# Patient Record
Sex: FEMALE | Race: BLACK OR AFRICAN AMERICAN | NOT HISPANIC OR LATINO | Employment: PART TIME | URBAN - METROPOLITAN AREA
[De-identification: names, ages, dates, MRNs, and addresses within clinical notes are randomized per-mention and may not be internally consistent; named-entity substitution may affect disease eponyms.]

---
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
## 2019-12-31 ENCOUNTER — HISTORICAL (OUTPATIENT)
Dept: ADMINISTRATIVE | Facility: HOSPITAL | Age: 19
End: 2019-12-31

## 2020-01-02 ENCOUNTER — HISTORICAL (OUTPATIENT)
Dept: ADMINISTRATIVE | Facility: HOSPITAL | Age: 20
End: 2020-01-02

## 2020-01-02 LAB
ABS NEUT (OLG): 9.17 X10(3)/MCL (ref 2.1–9.2)
AMPHET UR QL SCN: NEGATIVE
BARBITURATE SCN PRESENT UR: NEGATIVE
BASOPHILS # BLD AUTO: 0 X10(3)/MCL (ref 0–0.2)
BASOPHILS NFR BLD AUTO: 0 %
BENZODIAZ UR QL SCN: NEGATIVE
BILIRUB SERPL-MCNC: NEGATIVE MG/DL
BLOOD URINE, POC: NORMAL
BUN SERPL-MCNC: 9 MG/DL (ref 7–18)
CALCIUM SERPL-MCNC: 8.8 MG/DL (ref 8.5–10.1)
CANNABINOIDS UR QL SCN: NEGATIVE
CHLORIDE SERPL-SCNC: 107 MMOL/L (ref 98–107)
CLARITY, POC UA: NORMAL
CO2 SERPL-SCNC: 28 MMOL/L (ref 21–32)
COCAINE UR QL SCN: NEGATIVE
COLOR, POC UA: YELLOW
CREAT SERPL-MCNC: 0.6 MG/DL (ref 0.6–1.3)
CREAT/UREA NIT SERPL: 15
EOSINOPHIL # BLD AUTO: 0.1 X10(3)/MCL (ref 0–0.9)
EOSINOPHIL NFR BLD AUTO: 1 %
ERYTHROCYTE [DISTWIDTH] IN BLOOD BY AUTOMATED COUNT: 13 % (ref 11.5–14.5)
GLUCOSE SERPL-MCNC: 74 MG/DL (ref 74–106)
GLUCOSE UR QL STRIP: NEGATIVE
HBV SURFACE AG SERPL QL IA: NEGATIVE
HCT VFR BLD AUTO: 34.6 % (ref 35–46)
HCV AB SERPL QL IA: NONREACTIVE
HGB BLD-MCNC: 11.8 GM/DL (ref 12–16)
HIV 1+2 AB+HIV1 P24 AG SERPL QL IA: NONREACTIVE
IMM GRANULOCYTES # BLD AUTO: 0.12 10*3/UL
IMM GRANULOCYTES NFR BLD AUTO: 1 %
KETONES UR QL STRIP: NEGATIVE
LEUKOCYTE EST, POC UA: NORMAL
LYMPHOCYTES # BLD AUTO: 2.4 X10(3)/MCL (ref 0.6–4.6)
LYMPHOCYTES NFR BLD AUTO: 18 %
MCH RBC QN AUTO: 32 PG (ref 26–34)
MCHC RBC AUTO-ENTMCNC: 34.1 GM/DL (ref 31–37)
MCV RBC AUTO: 93.8 FL (ref 80–100)
MONOCYTES # BLD AUTO: 1 X10(3)/MCL (ref 0.1–1.3)
MONOCYTES NFR BLD AUTO: 8 %
NEUTROPHILS # BLD AUTO: 9.17 X10(3)/MCL (ref 2.1–9.2)
NEUTROPHILS NFR BLD AUTO: 72 %
NITRITE, POC UA: NEGATIVE
OPIATES UR QL SCN: NEGATIVE
PCP UR QL: NEGATIVE
PH UR STRIP.AUTO: 7 [PH] (ref 5–8)
PH, POC UA: 7
PLATELET # BLD AUTO: 288 X10(3)/MCL (ref 130–400)
PMV BLD AUTO: 9.8 FL (ref 7.4–10.4)
POTASSIUM SERPL-SCNC: 3.9 MMOL/L (ref 3.5–5.1)
PROTEIN, POC: NEGATIVE
RBC # BLD AUTO: 3.69 X10(6)/MCL (ref 4–5.2)
SODIUM SERPL-SCNC: 138 MMOL/L (ref 136–145)
SPECIFIC GRAVITY, POC UA: 1.02
TEMPERATURE, URINE (OHS): 25 DEGC (ref 20–25)
UROBILINOGEN, POC UA: NORMAL
WBC # SPEC AUTO: 12.8 X10(3)/MCL (ref 4.5–11)

## 2020-01-05 LAB — FINAL CULTURE: NO GROWTH

## 2020-01-08 ENCOUNTER — HISTORICAL (OUTPATIENT)
Dept: ADMINISTRATIVE | Facility: HOSPITAL | Age: 20
End: 2020-01-08

## 2020-01-08 LAB
APPEARANCE, UA: CLEAR
BACTERIA #/AREA URNS AUTO: ABNORMAL /HPF
BILIRUB SERPL-MCNC: NEGATIVE MG/DL
BILIRUB UR QL STRIP: NEGATIVE
BLOOD URINE, POC: NORMAL
CLARITY, POC UA: CLEAR
COLOR UR: YELLOW
COLOR, POC UA: YELLOW
GLUCOSE (UA): NEGATIVE
GLUCOSE 1H P 100 G GLC PO SERPL-MCNC: 89 MG/DL
GLUCOSE 2H P 100 G GLC PO SERPL-MCNC: 61 MG/DL (ref 65–139)
GLUCOSE P FAST SERPL-MCNC: 86 MG/DL (ref 65–99)
GLUCOSE UR QL STRIP: NEGATIVE
HGB UR QL STRIP: 0.2
HYALINE CASTS #/AREA URNS LPF: ABNORMAL /LPF
KETONES UR QL STRIP: NEGATIVE
KETONES UR QL STRIP: NEGATIVE
LEUKOCYTE EST, POC UA: NORMAL
LEUKOCYTE ESTERASE UR QL STRIP: 75 LEU/UL
NITRITE UR QL STRIP: NEGATIVE
NITRITE, POC UA: NEGATIVE
PH UR STRIP: 6.5 [PH] (ref 4.5–8)
PH, POC UA: 7
PROT UR QL STRIP: 10 MG/DL
PROTEIN, POC: NEGATIVE
RBC #/AREA URNS AUTO: ABNORMAL /HPF
SP GR UR STRIP: 1.02 (ref 1–1.03)
SPECIFIC GRAVITY, POC UA: 1.02
SQUAMOUS #/AREA URNS LPF: >100 /LPF
UROBILINOGEN UR STRIP-ACNC: NORMAL
UROBILINOGEN, POC UA: NORMAL
WBC #/AREA URNS AUTO: ABNORMAL /HPF

## 2020-01-31 ENCOUNTER — HISTORICAL (OUTPATIENT)
Dept: ADMINISTRATIVE | Facility: HOSPITAL | Age: 20
End: 2020-01-31

## 2020-01-31 LAB
APPEARANCE, UA: CLEAR
BACTERIA #/AREA URNS AUTO: ABNORMAL /HPF
BILIRUB SERPL-MCNC: NEGATIVE MG/DL
BILIRUB UR QL STRIP: NEGATIVE
BLOOD URINE, POC: NORMAL
CLARITY, POC UA: CLEAR
COLOR UR: ABNORMAL
COLOR, POC UA: YELLOW
GLUCOSE (UA): NEGATIVE
GLUCOSE UR QL STRIP: NEGATIVE
HGB UR QL STRIP: 0.1
HYALINE CASTS #/AREA URNS LPF: ABNORMAL /LPF
KETONES UR QL STRIP: NEGATIVE
KETONES UR QL STRIP: NEGATIVE
LEUKOCYTE EST, POC UA: NORMAL
LEUKOCYTE ESTERASE UR QL STRIP: 75 LEU/UL
NITRITE UR QL STRIP: NEGATIVE
NITRITE, POC UA: NEGATIVE
PH UR STRIP: 6.5 [PH] (ref 4.5–8)
PH, POC UA: 7
PROT UR QL STRIP: NEGATIVE
PROTEIN, POC: NEGATIVE
RBC #/AREA URNS AUTO: ABNORMAL /HPF
SP GR UR STRIP: 1.01 (ref 1–1.03)
SPECIFIC GRAVITY, POC UA: 1.02
SQUAMOUS #/AREA URNS LPF: ABNORMAL /LPF
T PALLIDUM AB SER QL: NONREACTIVE
UROBILINOGEN UR STRIP-ACNC: NORMAL
UROBILINOGEN, POC UA: NORMAL
WBC #/AREA URNS AUTO: ABNORMAL /HPF

## 2020-02-02 LAB — FINAL CULTURE: NORMAL

## 2020-03-16 LAB
BILIRUB SERPL-MCNC: NEGATIVE MG/DL
BLOOD URINE, POC: NORMAL
CLARITY, POC UA: CLEAR
COLOR, POC UA: YELLOW
GLUCOSE UR QL STRIP: NEGATIVE
KETONES UR QL STRIP: NEGATIVE
LEUKOCYTE EST, POC UA: NORMAL
NITRITE, POC UA: NEGATIVE
PH, POC UA: 6.5
PROTEIN, POC: NEGATIVE
SPECIFIC GRAVITY, POC UA: 1.03
UROBILINOGEN, POC UA: NORMAL

## 2020-04-16 ENCOUNTER — HISTORICAL (OUTPATIENT)
Dept: ADMINISTRATIVE | Facility: HOSPITAL | Age: 20
End: 2020-04-16

## 2020-04-16 LAB
ABS NEUT (OLG): 8.11 X10(3)/MCL (ref 2.1–9.2)
BASOPHILS # BLD AUTO: 0 X10(3)/MCL (ref 0–0.2)
BASOPHILS NFR BLD AUTO: 0 %
BILIRUB SERPL-MCNC: NEGATIVE MG/DL
BLOOD URINE, POC: NORMAL
CLARITY, POC UA: CLEAR
COLOR, POC UA: YELLOW
EOSINOPHIL # BLD AUTO: 0.1 X10(3)/MCL (ref 0–0.9)
EOSINOPHIL NFR BLD AUTO: 1 %
ERYTHROCYTE [DISTWIDTH] IN BLOOD BY AUTOMATED COUNT: 13.2 % (ref 11.5–14.5)
GLUCOSE UR QL STRIP: NEGATIVE
HCT VFR BLD AUTO: 32.8 % (ref 35–46)
HGB BLD-MCNC: 11.1 GM/DL (ref 12–16)
HIV 1+2 AB+HIV1 P24 AG SERPL QL IA: NONREACTIVE
IMM GRANULOCYTES # BLD AUTO: 0.15 10*3/UL
IMM GRANULOCYTES NFR BLD AUTO: 1 %
KETONES UR QL STRIP: NEGATIVE
LEUKOCYTE EST, POC UA: NEGATIVE
LYMPHOCYTES # BLD AUTO: 1.9 X10(3)/MCL (ref 0.6–4.6)
LYMPHOCYTES NFR BLD AUTO: 17 %
MCH RBC QN AUTO: 31.7 PG (ref 26–34)
MCHC RBC AUTO-ENTMCNC: 33.8 GM/DL (ref 31–37)
MCV RBC AUTO: 93.7 FL (ref 80–100)
MONOCYTES # BLD AUTO: 0.8 X10(3)/MCL (ref 0.1–1.3)
MONOCYTES NFR BLD AUTO: 7 %
NEUTROPHILS # BLD AUTO: 8.11 X10(3)/MCL (ref 2.1–9.2)
NEUTROPHILS NFR BLD AUTO: 73 %
NITRITE, POC UA: NEGATIVE
PH, POC UA: 7
PLATELET # BLD AUTO: 272 X10(3)/MCL (ref 130–400)
PMV BLD AUTO: 9.2 FL (ref 7.4–10.4)
PROTEIN, POC: NEGATIVE
RBC # BLD AUTO: 3.5 X10(6)/MCL (ref 4–5.2)
SPECIFIC GRAVITY, POC UA: 1.02
T PALLIDUM AB SER QL: NONREACTIVE
UROBILINOGEN, POC UA: NORMAL
WBC # SPEC AUTO: 11.1 X10(3)/MCL (ref 4.5–11)

## 2020-05-19 LAB
BILIRUB SERPL-MCNC: NEGATIVE MG/DL
BLOOD URINE, POC: NORMAL
CLARITY, POC UA: CLEAR
COLOR, POC UA: YELLOW
GLUCOSE UR QL STRIP: NEGATIVE
KETONES UR QL STRIP: NEGATIVE
LEUKOCYTE EST, POC UA: NEGATIVE
NITRITE, POC UA: NEGATIVE
PH, POC UA: 6.5
PROTEIN, POC: NEGATIVE
SPECIFIC GRAVITY, POC UA: 1.02
UROBILINOGEN, POC UA: NORMAL

## 2020-06-03 ENCOUNTER — HISTORICAL (OUTPATIENT)
Dept: ADMINISTRATIVE | Facility: HOSPITAL | Age: 20
End: 2020-06-03

## 2020-06-03 LAB
ABS NEUT (OLG): 8.75 X10(3)/MCL (ref 2.1–9.2)
BASOPHILS # BLD AUTO: 0.1 X10(3)/MCL (ref 0–0.2)
BASOPHILS NFR BLD AUTO: 0 %
BILIRUB SERPL-MCNC: NEGATIVE MG/DL
BLOOD URINE, POC: NEGATIVE
CLARITY, POC UA: NORMAL
COLOR, POC UA: YELLOW
EOSINOPHIL # BLD AUTO: 0.1 X10(3)/MCL (ref 0–0.9)
EOSINOPHIL NFR BLD AUTO: 1 %
ERYTHROCYTE [DISTWIDTH] IN BLOOD BY AUTOMATED COUNT: 13.8 % (ref 11.5–14.5)
GLUCOSE UR QL STRIP: NEGATIVE
HCT VFR BLD AUTO: 32.3 % (ref 35–46)
HGB BLD-MCNC: 10.7 GM/DL (ref 12–16)
HIV 1+2 AB+HIV1 P24 AG SERPL QL IA: NONREACTIVE
IMM GRANULOCYTES # BLD AUTO: 0.18 10*3/UL
IMM GRANULOCYTES NFR BLD AUTO: 2 %
KETONES UR QL STRIP: NEGATIVE
LEUKOCYTE EST, POC UA: NORMAL
LYMPHOCYTES # BLD AUTO: 2 X10(3)/MCL (ref 0.6–4.6)
LYMPHOCYTES NFR BLD AUTO: 16 %
MCH RBC QN AUTO: 31.1 PG (ref 26–34)
MCHC RBC AUTO-ENTMCNC: 33.1 GM/DL (ref 31–37)
MCV RBC AUTO: 93.9 FL (ref 80–100)
MONOCYTES # BLD AUTO: 1 X10(3)/MCL (ref 0.1–1.3)
MONOCYTES NFR BLD AUTO: 8 %
NEUTROPHILS # BLD AUTO: 8.75 X10(3)/MCL (ref 2.1–9.2)
NEUTROPHILS NFR BLD AUTO: 72 %
NITRITE, POC UA: NEGATIVE
PH, POC UA: 6.5
PLATELET # BLD AUTO: 277 X10(3)/MCL (ref 130–400)
PMV BLD AUTO: 9.6 FL (ref 7.4–10.4)
PROTEIN, POC: NORMAL
RBC # BLD AUTO: 3.44 X10(6)/MCL (ref 4–5.2)
SPECIFIC GRAVITY, POC UA: 1.02
T PALLIDUM AB SER QL: NONREACTIVE
UROBILINOGEN, POC UA: NORMAL
WBC # SPEC AUTO: 12.1 X10(3)/MCL (ref 4.5–11)

## 2020-06-10 ENCOUNTER — HISTORICAL (OUTPATIENT)
Dept: ADMINISTRATIVE | Facility: HOSPITAL | Age: 20
End: 2020-06-10

## 2020-06-10 LAB
BILIRUB SERPL-MCNC: NEGATIVE MG/DL
BLOOD URINE, POC: NORMAL
CLARITY, POC UA: CLEAR
COLOR, POC UA: YELLOW
GLUCOSE UR QL STRIP: NEGATIVE
KETONES UR QL STRIP: NEGATIVE
LEUKOCYTE EST, POC UA: NORMAL
NITRITE, POC UA: NEGATIVE
PH, POC UA: 6.5
PROTEIN, POC: NEGATIVE
SPECIFIC GRAVITY, POC UA: 1.02
UROBILINOGEN, POC UA: NORMAL

## 2020-06-12 LAB — FINAL CULTURE: NORMAL

## 2020-06-19 ENCOUNTER — HISTORICAL (OUTPATIENT)
Dept: ADMINISTRATIVE | Facility: HOSPITAL | Age: 20
End: 2020-06-19

## 2020-06-19 LAB
APPEARANCE, UA: CLEAR
BACTERIA #/AREA URNS AUTO: ABNORMAL /HPF
BILIRUB SERPL-MCNC: NEGATIVE MG/DL
BILIRUB UR QL STRIP: NEGATIVE
BLOOD URINE, POC: NORMAL
CLARITY, POC UA: NORMAL
COLOR UR: ABNORMAL
COLOR, POC UA: YELLOW
GLUCOSE (UA): NEGATIVE
GLUCOSE UR QL STRIP: NEGATIVE
HGB UR QL STRIP: NEGATIVE
HYALINE CASTS #/AREA URNS LPF: ABNORMAL /LPF
KETONES UR QL STRIP: NEGATIVE
KETONES UR QL STRIP: NEGATIVE
LEUKOCYTE EST, POC UA: NORMAL
LEUKOCYTE ESTERASE UR QL STRIP: 250 LEU/UL
NITRITE UR QL STRIP: NEGATIVE
NITRITE, POC UA: NEGATIVE
PH UR STRIP: 7 [PH] (ref 4.5–8)
PH, POC UA: 7
PROT UR QL STRIP: 10 MG/DL
PROTEIN, POC: NEGATIVE
RBC #/AREA URNS AUTO: ABNORMAL /HPF
SP GR UR STRIP: 1.01 (ref 1–1.03)
SPECIFIC GRAVITY, POC UA: 1.02
SQUAMOUS #/AREA URNS LPF: ABNORMAL /LPF
UROBILINOGEN UR STRIP-ACNC: NORMAL
UROBILINOGEN, POC UA: NORMAL
WBC #/AREA URNS AUTO: ABNORMAL /HPF

## 2020-06-21 LAB — FINAL CULTURE: NO GROWTH

## 2020-06-26 LAB
BILIRUB SERPL-MCNC: NEGATIVE MG/DL
BLOOD URINE, POC: NORMAL
CLARITY, POC UA: CLEAR
COLOR, POC UA: YELLOW
GLUCOSE UR QL STRIP: NEGATIVE
KETONES UR QL STRIP: NEGATIVE
NITRITE, POC UA: NEGATIVE
PH, POC UA: 6
PROTEIN, POC: NEGATIVE
SPECIFIC GRAVITY, POC UA: 1.01
UROBILINOGEN, POC UA: NORMAL

## 2020-09-03 LAB — POC BETA-HCG (QUAL): NEGATIVE

## 2020-12-01 LAB — POC BETA-HCG (QUAL): NEGATIVE

## 2021-06-01 LAB — POC BETA-HCG (QUAL): NEGATIVE

## 2021-09-07 LAB — POC BETA-HCG (QUAL): NEGATIVE

## 2021-10-09 ENCOUNTER — HISTORICAL (OUTPATIENT)
Dept: ADMINISTRATIVE | Facility: HOSPITAL | Age: 21
End: 2021-10-09

## 2021-10-09 LAB — SARS-COV-2 RNA RESP QL NAA+PROBE: NEGATIVE

## 2021-10-12 LAB — FINAL CULTURE: NORMAL

## 2022-01-02 ENCOUNTER — HISTORICAL (OUTPATIENT)
Dept: ADMINISTRATIVE | Facility: HOSPITAL | Age: 22
End: 2022-01-02

## 2022-01-02 LAB
FLUAV AG UPPER RESP QL IA.RAPID: NEGATIVE
FLUBV AG UPPER RESP QL IA.RAPID: NEGATIVE
SARS-COV-2 RNA RESP QL NAA+PROBE: DETECTED

## 2022-04-10 ENCOUNTER — HISTORICAL (OUTPATIENT)
Dept: ADMINISTRATIVE | Facility: HOSPITAL | Age: 22
End: 2022-04-10
Payer: MEDICAID

## 2022-04-24 VITALS
WEIGHT: 249.13 LBS | SYSTOLIC BLOOD PRESSURE: 109 MMHG | BODY MASS INDEX: 47.03 KG/M2 | DIASTOLIC BLOOD PRESSURE: 75 MMHG | HEIGHT: 61 IN | OXYGEN SATURATION: 98 %

## 2022-04-30 NOTE — PROGRESS NOTES
Patient:   Bebe Mosley            MRN: [id number]            FIN: [id number]               Age:   19 years     Sex:  Female     :  2000   Associated Diagnoses:   IUP (intrauterine pregnancy), incidental   Author:   Jonna Suggs DO      Chief Complaint   Pregnant      History of Present Illness     History of Present Illness:  19-year-old , at 14W6D EGD per LMP confirmed with US 19 presents for her initial OB visit.         Today: no complaints, recently had yeast infection      Chronic Issues: none      OB Review of Systems:  Vaginal bleeding: denies  Vaginal discharge: white, odor  Loss of fluid: denies  Contractions: denies  Headaches: denies  Vision changes: denies  Edema: denies      Gestational History:  -G1: current      Gyn History:   -LMP: 19  -Age at menarche: 8yo  -Menstrual hx: regular every 28days, heavy, cramping  -History of STDs and/or Abnormal PAPs: none    Past Medical History: eczema   Surgical History: tonsillectomy and adenoidectomy  Family History: Dad: diabetes  Social History: denies tobacco use, social drinker prior to pregnancy, marijuana use prior to finding out she was pregnant  Medications: none  Allergies: nkda             Review of Systems   Constitutional:  No fever, No chills, No fatigue.    Eye:  No recent visual problem.    Respiratory:  Shortness of breath, No cough.    Cardiovascular:  No chest pain, No palpitations, No peripheral edema.    Gastrointestinal:  Nausea, No vomiting, No diarrhea, No constipation.    Genitourinary:  No dysuria, No hematuria.    Hematology/Lymphatics:  No bleeding tendency.    Musculoskeletal:       Back pain: In the middle of the back, In the lower region, The pain is moderate.    Integumentary:  No rash, No pruritus, No breakdown.    Psychiatric:  No anxiety, No depression.       Physical Examination   Vital Signs   2020 13:52 CST       Temperature Oral          37.1 DegC                              Temperature Oral (calculated)             98.78 DegF                             Peripheral Pulse Rate     74 bpm                             Respiratory Rate          18 br/min                             SpO2                      100 %                             Saturation Probe Site     Hand, right                             Systolic Blood Pressure   116 mmHg                             Diastolic Blood Pressure  63 mmHg                             Mean Arterial Pressure, Cuff              81 mmHg                             Blood Pressure Location   Left arm                             Blood Pressure Cuff Size  Adult     General:  Alert and oriented, No acute distress.    Eye:  Extraocular movements are intact.    Respiratory:  Lungs are clear to auscultation, Respirations are non-labored, Breath sounds are equal.    Cardiovascular:  Normal rate, Regular rhythm, No murmur, No edema.    Gastrointestinal:  Soft, Non-tender, Normal bowel sounds, No organomegaly, Gravid.    Obstetric Exam     Vulva: no external lesions noted on labia      Perineum: intact.     Vagina: scant white discharge  Cervix: no cervical tenderness   Uterus: fundal height at the level of the pubis    Adnexa: non-tender     Baby: Santoro/baby A, heart tones 145 bpm.            Review / Management   Initial OB Labs: Initial OB labs collected on 1/2/20   - Blood Type and Rh:    - Antibody Screen:    - CBC H/H:    - HIV:    - RPR:    - GC:    - CT:    - HBsAg:    - HCVAb:    - Rubella:    - Varicella:    - UA & Culture:    - Sickle Cell Screen:    - PAP:    - Influenza vaccine date:    15-20 Weeks Lab    - Quad Screen: _    28 Week Lab    - 1H GTT: _    - Rhogam: _    - Date of Tdap: _    - CBC H/H: _    - RPR: _    36 Week Lab    - CBC H/H: _    - RPR: _    - GBS Culture: _    - HIV: _    - Urine: GC: _      Radiology results     Ultrasound(s):  US 12/31/19:  History:  Check fetal presentation.     Reference  No previous.     Findings:  There  is a single intrauterine gestation demonstrated in breech  presentation. Fetal heart rate was 145 BPM. Anterior/fundal placenta.  Limited fetal survey demonstrates no gross abnormalities.     Average ultrasound age is 14 weeks 5 days.  BPD = 2.8 cm - 14 weeks 6 days.  HC = 10 cm - 14 weeks 4 days.  AC = 8.5 cm - 14 weeks 6 days.  FL = 1.5 cm - 14 weeks 3 days.     Estimated fetal weight - 102 grams +/- 15 grams (0 pounds 4 ounces +/-  1 ounces)  Amniotic fluid appears adequate for gestational age.     Impression:  Single intrauterine gestation with average ultrasound age of 14 weeks  5 days as described. Detailed fetal anatomic survey is recommended as  an outpatient under OB supervision at 18-20 weeks gestation.         Impression and Plan     1. Intrauterine Pregnancy:  - OB Protocol   - PNVs  - Routine (initial) labs: pending  - Mother plans on undecided on feeding  - Strict Labor Precautions: Discussed meds, diet, and complications associated with pregnancy in detail. fever, vaginal bleeding or leaking fluid, belly cramping or pain, shortness of breathchest pain, swelling of the facehands, ankles and feet or legs.  If don't feel the baby move in over an hour. Change in vision. Severe headache that are noted resolved with medication.  Strict diet and over-the-counter precautions were provided.   - Due to obesity and family history of diabetes, will get 2hr GTT next Wednesday      Disposition:  Assigned to FM resident  RTC in 4wks       Diagnosis     IUP (intrauterine pregnancy), incidental ([id number] Z34.90).       Jonna Suggs DO  [employer] FM Resident HO-1

## 2022-04-30 NOTE — PROGRESS NOTES
Patient:   Bebe Mosley            MRN: [id number]            FIN: [id number]               Age:   19 years     Sex:  Female     :  2000   Associated Diagnoses:   IUP (intrauterine pregnancy), incidental   Author:   Jonna Suggs DO      Chief Complaint   Pregnant   2020 8:13 CST       ob 19 week , c/o sharp pain to abd. at intervals        History of Present Illness     History of Present Illness:  19-year-old , at 19W0D EGD per LMP confirmed with US 19 presents for antepartum visit.         Today: having some stomach pains that come on and off, did have a sharp pain that lasted for a minute that worried her, but then went away; thinks maybe due to growing pains; no longer having headaches; has not been taking PNVs      Chronic Issues: none      OB Review of Systems:  Fetal Movement: yes  Vaginal bleeding: denies  Vaginal discharge: white, off and on  Loss of fluid: denies  Contractions: denies  Headaches: denies  Vision changes: denies  Edema: denies      Gestational History:  -G1: current      Gyn History:   -LMP: 19  -Age at menarche: 8yo  -Menstrual hx: regular every 28days, heavy, cramping  -History of STDs and/or Abnormal PAPs: none    Past Medical History: eczema   Surgical History: tonsillectomy and adenoidectomy  Family History: Dad: diabetes  Social History: denies tobacco use, social drinker prior to pregnancy, marijuana use prior to finding out she was pregnant  Medications: none  Allergies: nkda             Review of Systems   Constitutional:  No fever, No chills, No fatigue.    Eye:  No recent visual problem.    Respiratory:  Shortness of breath, No cough.    Cardiovascular:  No chest pain, No palpitations, No peripheral edema.    Gastrointestinal:  No nausea, No vomiting, No diarrhea, No constipation.    Genitourinary:  No dysuria, No hematuria.    Hematology/Lymphatics:  No bleeding tendency.    Musculoskeletal:       Back pain: In the middle of the  back, In the lower region, The pain is mild.    Integumentary:  No rash, No pruritus, No breakdown.    Psychiatric:  No anxiety, No depression.       Physical Examination   Vital Signs   1/31/2020 8:17 CST       Temperature Oral          36.4 DegC                             Temperature Oral (calculated)             97.52 DegF                             Peripheral Pulse Rate     65 bpm                             Respiratory Rate          20 br/min                             SpO2                      100 %                             Oxygen Therapy            Room air                             Systolic Blood Pressure   90 mmHg                             Diastolic Blood Pressure  50 mmHg  LOW                             Blood Pressure Location   Left arm     General: well appearing, pleasant, cheerful  RESP: clear to auscultation bilaterally, non labored  CV: regular rate and rhythm, no murmurs, no edema  ABD: gravid, nontender     FHTs: 145 bpm; (lower midline)     Fundal height: 18cm (difficult to assess due to body habitus)         Review / Management   Initial OB Labs: Initial OB labs collected on 1/2/20   - Blood Type and Rh: O+   - Antibody Screen:    - CBC H/H: 11.9/34.6   - HIV: nonreactive   - RPR:    - GC: not detected   - CT: not detected   - HBsAg: negative   - HCVAb: nonreactive   - Rubella:    - Varicella:    - UA & Culture:    - Sickle Cell Screen: negative   - PAP: NEGATIVE FOR INTRAEPITHELIAL LESION OR MALIGNANCY.   - Influenza vaccine date:    15-20 Weeks Lab    - Quad Screen: _    28 Week Lab    - 1H GTT: _    - Rhogam: _    - Date of Tdap: _    - CBC H/H: _    - RPR: _    36 Week Lab    - CBC H/H: _    - RPR: _    - GBS Culture: _    - HIV: _    - Urine: GC: _      Radiology results     Ultrasound(s):  US 12/31/19:  History:  Check fetal presentation.     Reference  No previous.     Findings:  There is a single intrauterine gestation demonstrated in breech  presentation. Fetal heart rate was  145 BPM. Anterior/fundal placenta.  Limited fetal survey demonstrates no gross abnormalities.     Average ultrasound age is 14 weeks 5 days.  BPD = 2.8 cm - 14 weeks 6 days.  HC = 10 cm - 14 weeks 4 days.  AC = 8.5 cm - 14 weeks 6 days.  FL = 1.5 cm - 14 weeks 3 days.     Estimated fetal weight - 102 grams +/- 15 grams (0 pounds 4 ounces +/-  1 ounces)  Amniotic fluid appears adequate for gestational age.     Impression:  Single intrauterine gestation with average ultrasound age of 14 weeks  5 days as described. Detailed fetal anatomic survey is recommended as  an outpatient under OB supervision at 18-20 weeks gestation.      Results review:  Lab results   1/31/2020 8:25 CST       Urine Color Urine Dipstick                Yellow                             Urine Appearance Urine Dipstick           Clear                             pH Urine Dipstick         7                             Specific Gravity Urine Dipstick           1.020                             Blood Urine Dipstick      Moderate                             Glucose Urine Dipstick    Negative                             Ketones Urine Dipstick    Negative                             Protein Urine Dipstick    Negative                             Bilirubin Urine Dipstick  Negative                             Urobilinogen Urine Dipstick               0.2 mg/dl                             Leukocytes Urine Dipstick Trace                             Nitrite Urine Dipstick    Negative  .       Impression and Plan     1. Intrauterine Pregnancy:  - OB Protocol   - Discussed taking PNVs daily  - Routine labs: as above; quad screen today, rubella, varicella, rpr; urinalysis sent  - Anatomy ultrasound to be scheduled  - Mother plans on breast feeding  - Mother wants contraception but undecided on kind at this time  - Strict Labor Precautions: Fever, vaginal bleeding or leaking fluid, belly cramping or pain, shortness of breathchest pain, swelling of the  facehands, ankles and feet or legs.  If don't feel the baby move in over an hour. Change in vision. Severe headache that are not resolved with medication.  Strict diet and over-the-counter precautions were provided. Discussed going to [facility] ED for any symptoms or signs of labor.  - Due to obesity and family history of diabetes, will get 2hr GTT was done and was negative      Disposition: RTC 4 weeks      Diagnosis     IUP (intrauterine pregnancy), incidental ([id number] Z34.90).       Jonna Suggs DO  [employer]  Resident HO-1

## 2022-05-03 NOTE — HISTORICAL [facility] CERNER
This is a historical note converted from [employer]. Formatting and pictures may have been removed.  Please reference [facility] for original formatting and attached multimedia. Chief Complaint  OB 39w0d - no c/o  History of Present Illness  20-year-old , at 39W0D EGD per LMP confirmed with US 19?presents for antepartum visit.?  ?   Today: pressure and cramping, feels cramping/contraction feelings 4-5x per day, lasts for couple minutes then resolves and doesnt come back for several hours  ?   Chronic Issues:?none  ?  Gestational History:  -G1: current  ?   Gyn History:?  -LMP: 19  -Age at menarche: 10yo  -Menstrual hx: regular every 28days, heavy, cramping  -History of STDs and/or Abnormal PAPs: none  ?  Past Medical History: eczema?  Surgical History: tonsillectomy and adenoidectomy  Family History: Dad: diabetes  Social History: denies tobacco use, social drinker prior to pregnancy, marijuana use prior to finding out she was pregnant  Medications: none  Allergies:?nkda  Review of Systems  OB Review of Systems:  ??Fetal Movements: Yes  ??Vaginal bleeding: None  ??Leakage of Fluid: None?  ??Vaginal Discharge:?white no odor  ??Headaches: None  ??Lower Extremity Edema:?yes  ??Vision Changes: None  ??Contractions:?see HPI  ???  Constitutional: no fever, no chills, no weight loss  CV:?bilateral feet?edema, no chest pain  :?no urinary retention, no urinary incontinence, denies dysuria  GI:?no fecal incontinence, no constipation, no diarrhea, no nausea, no vomiting, +heartburn  RESP:?no SOB, no wheezing, no difficulty breathing  Psych:?no depression, no anxiety  Physical Exam  Vitals & Measurements  T:?36.6? ?C (Oral)? HR:?95(Peripheral)? RR:?20? BP:?100/68? SpO2:?98%?  ?  General:?well appearing, pleasant, cheerful  RESP: clear to auscultation bilaterally, non labored  CV: regular rate and rhythm, no murmurs, no edema  ABD:?gravid, nontender  ???FHTs:?156 bpm  ???Fundal height:?40cm?  Cervix: 0cm,?-2 station,?0%  effacement  ??  ?  Review / Management?  Initial OB Labs  ????- Blood Type and Rh: O+  ????- Antibody Screen: neg  ????- CBC H/H: 11.9/34.6  ????- HIV: nonreactive  ????- RPR: NR  ????- GC: not detected  ????- CT: not detected  ????- HBsAg: negative  ????- HCVAb: nonreactive  ????- Rubella: immune  ????- Varicella: nonimmune  ????- UA & Culture: WNL, no culture obtained  ????- Sickle Cell Screen: negative  ????- PAP: NIL  ????- Influenza vaccine date:  ?  15-20 Weeks Lab  ???? - Quad Screen: Negative  ?  28 Week Lab  ???? - 1H GTT: 89  ???? - Rhogam: N/A  ???? - Date of Tdap: 6/10/2020  ???? - CBC H/H: 11.1/32.8  ???? - RPR: NR  ?  36 Week Lab-  ???? - CBC H/H: 10.7/32.3  ???? - RPR: nonreactive  ???? - GBS Culture: neg  ???? - HIV: nonreactive  ???? - cervical: GC: neg  ?  Radiology results:  Ultrasound(s): US 12/31/19: There is a single intrauterine gestation demonstrated in breech presentation. Fetal heart rate was 145 BPM. Anterior/fundal placenta. Limited fetal survey demonstrates no gross abnormalities. Average ultrasound age is 14 weeks 5 days.  20wk anatomy ultrasound 2/5/20: 19 5^7 weeks single viable intrauterine pregnancy with normal fetal growth, normal amniotic fluid volume, and no anomalies seen today.  Assessment/Plan  1.?39 weeks gestation of pregnancy?Z3A.39  - OB Protocol?  - PNV daily  - Routine labs: as above  - Urine dip reviewed trace blood, + leukocytes sending for UA and culture  - Mother plans on bottle feeding  - Contraception: wants Nexplanon considering getting Depo at the hospital prior to discharge  - NST today  - Strict Labor Precautions: Fever, vaginal bleeding or leaking fluid, belly cramping or pain, shortness of breath-chest pain, swelling of the face-hands, ankles and feet or legs. ?If dont feel the baby move in over an hour. Change in vision. Severe headache that are not resolved with medication.??Discussed going to [facility] ED for any symptoms or signs of labor.  - Due to obesity  and family history of diabetes, 2hr GTT was done and was negative  Ordered:  Clinic Follow up, *Est. 06/26/20 3:00:00 CDT, Order for future visit, 39 weeks gestation of pregnancy  Pregnant, [facility]  Urinalysis with Microscopic if Indicated, Routine collect, Urine, Order for future visit, 06/19/20 11:26:00 CDT, Stop date 06/19/20 11:26:00 CDT, Nurse collect, 39 weeks gestation of pregnancy  ?  2.?Pregnant?Z33.1  Ordered:  Clinic Follow up, *Est. 06/26/20 3:00:00 CDT, Order for future visit, 39 weeks gestation of pregnancy  Pregnant, [facility]  ?  RTC in 1 week unless delivers  ?  Jonna Suggs DO  [facility] Resident HO-1  Referrals  Clinic Follow up, *Est. 06/26/20 3:00:00 CDT, Order for future visit, 39 weeks gestation of pregnancy  Pregnant, [facility]   Problem List/Past Medical History  Ongoing  Infectious disease in mother complicating pregnancy, childbirth AND/OR puerperium  Morbid obesity  Pregnant  Historical  Eczema  Obesity  Procedure/Surgical History  Drainage of Right Upper Arm Skin, External Approach (04/21/2016)  Incision and drainage of abscess (eg, carbuncle, suppurative hidradenitis, cutaneous or subcutaneous abscess, cyst, furuncle, or paronychia); complicated or multiple (04/21/2016)  Tonsils and adenoids   Medications  Prenatal Multivitamin/Folic Acid 0.4 mg oral tablet ([facility] Substitution), 1 tab(s), Oral, Daily, 2 refills  Allergies  No Known Medication Allergies  Social History  Abuse/Neglect  No, No, Yes, 06/19/2020  Alcohol - Denies Alcohol Use, 04/21/2016  Never, 06/03/2020  Employment/School  Employed, Work/School description: [employer]. Highest education level: High school. No, 12/31/2019  Exercise  Exercise duration: 0., 12/31/2019  Home/Environment  Lives with Mother. Living situation: Home/Independent. TV/Computer concerns: No., 12/31/2019  Nutrition/Health  Regular, Good, 12/31/2019  Sexual  Sexually active: Yes. Sexual orientation:  Straight or heterosexual. Gender Identity Identifies as female. No, 12/31/2019  Substance Use - Denies Substance Abuse, 04/21/2016  Never, 06/19/2020  Tobacco - Denies Tobacco Use, 04/21/2016  Never (less than 100 in lifetime), N/A, 06/19/2020  Family History  Diabetes mellitus type 2: Father.  Immunizations  Vaccine Date Status   tetanus/diphtheria/pertussis, acel(Tdap) 06/10/2020 Given   meningococcal conjugate vaccine 12/09/2014 Recorded   tetanus/diphtheria/pertussis, acel(Tdap) 06/07/2013 Recorded   human papillomavirus vaccine 08/27/2012 Recorded   human papillomavirus vaccine 03/14/2011 Recorded   human papillomavirus vaccine 11/17/2009 Recorded   influenza virus vaccine, inactivated 11/17/2009 Recorded   meningococcal conjugate vaccine 11/17/2009 Recorded   diphtheria/pertussis, acel/tetanus ped 10/07/2008 Recorded   poliovirus vaccine, live, trivalent 08/12/2003 Recorded   measles/mumps/rubella virus vaccine 08/12/2003 Recorded   diphtheria/pertussis, acel/tetanus ped 03/27/2002 Recorded   poliovirus vaccine, inactivated 08/07/2000 Recorded   measles/mumps/rubella virus vaccine 08/07/2000 Recorded   diphtheria/pertussis, acel/tetanus ped 08/07/2000 Recorded   poliovirus vaccine, live, trivalent 2000 Recorded   hepatitis B pediatric vaccine 2000 Recorded   poliovirus vaccine, live, trivalent 2000 Recorded   hepatitis B pediatric vaccine 2000 Recorded   hepatitis B pediatric vaccine 1998 Recorded   Health Maintenance  Health Maintenance  ???Pending?(in the next year)  ??? ??OverDue  ??? ? ? ?Diabetes Screening due??and every?  ??? ??Due In Future?  ??? ? ? ?Alcohol Misuse Screening not due until??01/01/21??and every 1??year(s)  ??? ? ? ?Obesity Screening not due until??01/01/21??and every 1??year(s)  ??? ? ? ?Depression Screening not due until??01/30/21??and every 1??year(s)  ??? ? ? ?ADL Screening not due until??03/16/21??and every 1??year(s)  ???Satisfied?(in the past 1  year)  ??? ??Satisfied?  ??? ? ? ?ADL Screening on??03/16/20.??Satisfied by Allegra Stratton LPN  ??? ? ? ?Alcohol Misuse Screening on??03/16/20.??Satisfied by Allegra Stratton LPN  ??? ? ? ?Blood Pressure Screening on??06/19/20.??Satisfied by Jovana Greenberg RN  ??? ? ? ?Body Mass Index Check on??03/16/20.??Satisfied by Allegra Stratton LPN  ??? ? ? ?Cervical Cancer Screening on??01/02/20.??Satisfied by Jessica Burks  ??? ? ? ?Depression Screening on??01/31/20.??Satisfied by Licha Rossi LPN  ??? ? ? ?Diabetes Screening on??01/02/20.??Satisfied by Chelle Cummings  ??? ? ? ?Influenza Vaccine on??03/16/20.??Satisfied by Allegra Stratton LPN  ??? ? ? ?Obesity Screening on??06/10/20.??Satisfied by Bhavna Velez  ??? ? ? ?Tetanus Vaccine on??06/10/20.??Satisfied by Bhavna Velez  ?      Pt discussed with resident at the time of visit. Agree with assessment and plan.

## 2022-07-02 ENCOUNTER — HOSPITAL ENCOUNTER (EMERGENCY)
Facility: HOSPITAL | Age: 22
Discharge: HOME | End: 2022-07-02
Attending: EMERGENCY MEDICINE
Payer: MEDICAID

## 2022-07-02 VITALS
HEART RATE: 79 BPM | WEIGHT: 256 LBS | SYSTOLIC BLOOD PRESSURE: 107 MMHG | RESPIRATION RATE: 18 BRPM | HEIGHT: 61 IN | TEMPERATURE: 99 F | OXYGEN SATURATION: 100 % | DIASTOLIC BLOOD PRESSURE: 76 MMHG | BODY MASS INDEX: 48.33 KG/M2

## 2022-07-02 DIAGNOSIS — R55 NEAR SYNCOPE: ICD-10-CM

## 2022-07-02 DIAGNOSIS — R25.1 SHAKING: ICD-10-CM

## 2022-07-02 DIAGNOSIS — R53.1 WEAKNESS: Primary | ICD-10-CM

## 2022-07-02 LAB
ALBUMIN SERPL-MCNC: 3.5 GM/DL (ref 3.5–5)
ALBUMIN/GLOB SERPL: 0.8 RATIO (ref 1.1–2)
ALP SERPL-CCNC: 64 UNIT/L (ref 40–150)
ALT SERPL-CCNC: 13 UNIT/L (ref 0–55)
AMPHET UR QL SCN: NEGATIVE
APPEARANCE UR: CLEAR
AST SERPL-CCNC: 19 UNIT/L (ref 5–34)
B-HCG SERPL QL: NEGATIVE
BACTERIA #/AREA URNS AUTO: NORMAL /HPF
BARBITURATE SCN PRESENT UR: NEGATIVE
BASOPHILS # BLD AUTO: 0.06 X10(3)/MCL (ref 0–0.2)
BASOPHILS NFR BLD AUTO: 0.7 %
BENZODIAZ UR QL SCN: NEGATIVE
BILIRUB UR QL STRIP.AUTO: NEGATIVE MG/DL
BILIRUBIN DIRECT+TOT PNL SERPL-MCNC: 0.5 MG/DL
BUN SERPL-MCNC: 9.9 MG/DL (ref 7–18.7)
CALCIUM SERPL-MCNC: 8.7 MG/DL (ref 8.4–10.2)
CANNABINOIDS UR QL SCN: NEGATIVE
CHLORIDE SERPL-SCNC: 104 MMOL/L (ref 98–107)
CO2 SERPL-SCNC: 27 MMOL/L (ref 22–29)
COCAINE UR QL SCN: NEGATIVE
COLOR UR AUTO: ABNORMAL
CREAT SERPL-MCNC: 0.87 MG/DL (ref 0.55–1.02)
EOSINOPHIL # BLD AUTO: 0.09 X10(3)/MCL (ref 0–0.9)
EOSINOPHIL NFR BLD AUTO: 1.1 %
ERYTHROCYTE [DISTWIDTH] IN BLOOD BY AUTOMATED COUNT: 13.4 % (ref 11.5–17)
FLUAV AG UPPER RESP QL IA.RAPID: NOT DETECTED
FLUBV AG UPPER RESP QL IA.RAPID: NOT DETECTED
GLOBULIN SER-MCNC: 4.4 GM/DL (ref 2.4–3.5)
GLUCOSE SERPL-MCNC: 102 MG/DL (ref 74–100)
GLUCOSE UR QL STRIP.AUTO: NEGATIVE MG/DL
HCT VFR BLD AUTO: 39 % (ref 37–47)
HGB BLD-MCNC: 13.1 GM/DL (ref 12–16)
IMM GRANULOCYTES # BLD AUTO: 0.02 X10(3)/MCL (ref 0–0.04)
IMM GRANULOCYTES NFR BLD AUTO: 0.2 %
KETONES UR QL STRIP.AUTO: NEGATIVE MG/DL
LEUKOCYTE ESTERASE UR QL STRIP.AUTO: NEGATIVE UNIT/L
LIPASE SERPL-CCNC: 13 U/L
LYMPHOCYTES # BLD AUTO: 2.51 X10(3)/MCL (ref 0.6–4.6)
LYMPHOCYTES NFR BLD AUTO: 30.9 %
MCH RBC QN AUTO: 31.9 PG (ref 27–31)
MCHC RBC AUTO-ENTMCNC: 33.6 MG/DL (ref 33–36)
MCV RBC AUTO: 94.9 FL (ref 80–94)
MDMA UR QL SCN: NEGATIVE
MONOCYTES # BLD AUTO: 0.66 X10(3)/MCL (ref 0.1–1.3)
MONOCYTES NFR BLD AUTO: 8.1 %
NEUTROPHILS # BLD AUTO: 4.8 X10(3)/MCL (ref 2.1–9.2)
NEUTROPHILS NFR BLD AUTO: 59 %
NITRITE UR QL STRIP.AUTO: NEGATIVE
NRBC BLD AUTO-RTO: 0 %
OPIATES UR QL SCN: NEGATIVE
PCP UR QL: NEGATIVE
PH UR STRIP.AUTO: 6.5 [PH]
PH UR: 6.5 [PH] (ref 3–11)
PLATELET # BLD AUTO: 327 X10(3)/MCL (ref 130–400)
PMV BLD AUTO: 9.5 FL (ref 7.4–10.4)
POTASSIUM SERPL-SCNC: 3.6 MMOL/L (ref 3.5–5.1)
PROT SERPL-MCNC: 7.9 GM/DL (ref 6.4–8.3)
PROT UR QL STRIP.AUTO: NEGATIVE MG/DL
RBC # BLD AUTO: 4.11 X10(6)/MCL (ref 4.2–5.4)
RBC #/AREA URNS AUTO: NORMAL /HPF
RBC UR QL AUTO: ABNORMAL UNIT/L
SARS-COV-2 RNA RESP QL NAA+PROBE: NOT DETECTED
SODIUM SERPL-SCNC: 142 MMOL/L (ref 136–145)
SP GR UR STRIP.AUTO: 1.01
SPECIFIC GRAVITY, URINE AUTO (.000) (OHS): 1.01 (ref 1–1.03)
SQUAMOUS #/AREA URNS AUTO: NORMAL /HPF
TROPONIN I SERPL-MCNC: 0.01 NG/ML (ref 0–0.04)
UROBILINOGEN UR STRIP-ACNC: 0.2 MG/DL
WBC # SPEC AUTO: 8.1 X10(3)/MCL (ref 4.5–11.5)
WBC #/AREA URNS AUTO: NORMAL /HPF

## 2022-07-02 PROCEDURE — 63600175 PHARM REV CODE 636 W HCPCS: Performed by: EMERGENCY MEDICINE

## 2022-07-02 PROCEDURE — 93010 EKG 12-LEAD: ICD-10-PCS | Mod: ,,, | Performed by: INTERNAL MEDICINE

## 2022-07-02 PROCEDURE — 25000003 PHARM REV CODE 250: Performed by: EMERGENCY MEDICINE

## 2022-07-02 PROCEDURE — 99284 EMERGENCY DEPT VISIT MOD MDM: CPT | Mod: 25

## 2022-07-02 PROCEDURE — 81025 URINE PREGNANCY TEST: CPT | Performed by: EMERGENCY MEDICINE

## 2022-07-02 PROCEDURE — 83690 ASSAY OF LIPASE: CPT | Performed by: EMERGENCY MEDICINE

## 2022-07-02 PROCEDURE — 81001 URINALYSIS AUTO W/SCOPE: CPT | Mod: 59 | Performed by: EMERGENCY MEDICINE

## 2022-07-02 PROCEDURE — 93005 ELECTROCARDIOGRAM TRACING: CPT

## 2022-07-02 PROCEDURE — 84484 ASSAY OF TROPONIN QUANT: CPT | Performed by: EMERGENCY MEDICINE

## 2022-07-02 PROCEDURE — 93010 ELECTROCARDIOGRAM REPORT: CPT | Mod: ,,, | Performed by: INTERNAL MEDICINE

## 2022-07-02 PROCEDURE — 85025 COMPLETE CBC W/AUTO DIFF WBC: CPT | Performed by: EMERGENCY MEDICINE

## 2022-07-02 PROCEDURE — 80053 COMPREHEN METABOLIC PANEL: CPT | Performed by: EMERGENCY MEDICINE

## 2022-07-02 PROCEDURE — 36415 COLL VENOUS BLD VENIPUNCTURE: CPT | Performed by: EMERGENCY MEDICINE

## 2022-07-02 PROCEDURE — 96374 THER/PROPH/DIAG INJ IV PUSH: CPT

## 2022-07-02 PROCEDURE — 80307 DRUG TEST PRSMV CHEM ANLYZR: CPT | Performed by: EMERGENCY MEDICINE

## 2022-07-02 PROCEDURE — 87636 SARSCOV2 & INF A&B AMP PRB: CPT | Performed by: EMERGENCY MEDICINE

## 2022-07-02 RX ORDER — KETOROLAC TROMETHAMINE 30 MG/ML
30 INJECTION, SOLUTION INTRAMUSCULAR; INTRAVENOUS
Status: COMPLETED | OUTPATIENT
Start: 2022-07-02 | End: 2022-07-02

## 2022-07-02 RX ADMIN — SODIUM CHLORIDE 1000 ML: 9 INJECTION, SOLUTION INTRAVENOUS at 05:07

## 2022-07-02 RX ADMIN — KETOROLAC TROMETHAMINE 30 MG: 30 INJECTION, SOLUTION INTRAMUSCULAR at 05:07

## 2022-07-02 NOTE — ED TRIAGE NOTES
C/o shaking and feeling nauseated like she was about to pass out at 1600 today. States she went out last night and consumed 2 bottles of [employer] clear liquor.

## 2022-07-02 NOTE — ED PROVIDER NOTES
Encounter Date: 7/2/2022       History     Chief Complaint   Patient presents with    Shaking     C/o shaking and feeling nauseated like she was about to pass out at 1600 today. States she went out last night and consumed 2 bottles of [employer] clear liquor.     The history is provided by the patient. No  was used.   General Illness   The current episode started 1 to 2 hours ago. The problem has been gradually improving. Nothing relieves the symptoms. Nothing aggravates the symptoms. Pertinent negatives include no fever, no nausea, no sore throat, no shortness of breath and no rash.   Admits to heavy EtOH intake last night and feeling hungover this AM.  This afternoon, she began shaking and felt near-syncopal.  Somewhat improved now.    Review of patient's allergies indicates:  No Known Allergies  No past medical history on file. none  No past surgical history on file. T&A  No family history on file.     Review of Systems   Constitutional: Negative for fever.   HENT: Negative for sore throat.    Respiratory: Negative for shortness of breath.    Cardiovascular: Negative for chest pain.   Gastrointestinal: Negative for nausea.   Genitourinary: Negative for dysuria.   Musculoskeletal: Negative for back pain.   Skin: Negative for rash.   Neurological: Negative for weakness.   Hematological: Does not bruise/bleed easily.       Physical Exam     Initial Vitals [07/02/22 1713]   BP Pulse Resp Temp SpO2   107/76 79 18 98.6 °F (37 °C) 100 %      MAP       --         Physical Exam    Nursing note and vitals reviewed.  Constitutional: She appears well-developed and well-nourished.   HENT:   Head: Normocephalic and atraumatic.   Right Ear: External ear normal.   Left Ear: External ear normal.   Eyes: Conjunctivae and EOM are normal. Pupils are equal, round, and reactive to light.   Neck: Neck supple.   Normal range of motion.  Cardiovascular: Normal rate, regular rhythm, normal heart sounds and intact  distal pulses.   Pulmonary/Chest: Breath sounds normal.   Abdominal: Abdomen is soft. Bowel sounds are normal.   obese   Musculoskeletal:         General: Normal range of motion.      Cervical back: Normal range of motion and neck supple.     Neurological: She is alert and oriented to person, place, and time. GCS score is 15. GCS eye subscore is 4. GCS verbal subscore is 5. GCS motor subscore is 6.   Skin: Skin is warm and dry. Capillary refill takes less than 2 seconds.   Psychiatric: She has a normal mood and affect. Her behavior is normal. Judgment and thought content normal.         ED Course   Procedures  Labs Reviewed   COMPREHENSIVE METABOLIC PANEL - Abnormal; Notable for the following components:       Result Value    Glucose Level 102 (*)     Globulin 4.4 (*)     Albumin/Globulin Ratio 0.8 (*)     All other components within normal limits   URINALYSIS, REFLEX TO URINE CULTURE - Abnormal; Notable for the following components:    Color, UA Straw (*)     Blood, UA Small (*)     All other components within normal limits   CBC WITH DIFFERENTIAL - Abnormal; Notable for the following components:    RBC 4.11 (*)     MCV 94.9 (*)     MCH 31.9 (*)     All other components within normal limits   PREGNANCY TEST, URINE RAPID - Normal   LIPASE - Normal   TROPONIN I - Normal   DRUG SCREEN, URINE (BEAKER) - Normal    Narrative:     Cut off concentrations:    Amphetamines - 1000 ng/ml  Barbiturates - 200 ng/ml  Benzodiazepine - 200 ng/ml  Cannabinoids (THC) - 50 ng/ml  Cocaine - 300 ng/ml  Fentanyl - 1.0 ng/ml  MDMA - 500 ng/ml  Opiates - 300 ng/ml   Phencyclidine (PCP) - 25 ng/ml    Specimen submitted for drug analysis and tested for pH and specific gravity in order to evaluate sample integrity. Suspect tampering if specific gravity is <1.003 and/or pH is not within the range of 4.5 - 8.0  False negatives may result form substances such as bleach added to urine.  False positives may result for the presence of a substance  with similar chemical structure to the drug or its metabolite.    This test provides only a PRELIMINARY analytical test result. A more specific alternate chemical method must be used in order to obtain a confirmed analytical result. Gas chromatography/mass spectrometry (GC/MS) is the preferred confirmatory method. Other chemical confirmation methods are available. Clinical consideration and professional judgement should be applied to any drug of abuse test result, particularly when preliminary positive results are used.    Positive results will be confirmed only at the physicians request. Unconfirmed screening results are to be used only for medical purposes (treatment).        URINALYSIS, MICROSCOPIC - Normal    Narrative:     Urine microscopic results may be inaccurate, <3cc sample submitted. Microscopic performed on unspun urine   CBC W/ AUTO DIFFERENTIAL    Narrative:     The following orders were created for panel order CBC auto differential.  Procedure                               Abnormality         Status                     ---------                               -----------         ------                     CBC with Differential[[id number]]        Abnormal            Final result                 Please view results for these tests on the individual orders.   COVID/FLU A&B PCR     EKG Readings: (Independently Interpreted)   Initial Reading: No STEMI. Rhythm: Normal Sinus Rhythm. Heart Rate: 71. Ectopy: No Ectopy. Conduction: Normal. ST Segments: Normal ST Segments. T Waves: Normal. Axis: Normal. Clinical Impression: Normal Sinus Rhythm       Imaging Results    None          Medications   sodium chloride 0.9% bolus 1,000 mL (1,000 mLs Intravenous New Bag 7/2/22 1751)   ketorolac injection 30 mg (30 mg Intravenous Given 7/2/22 1751)                          Clinical Impression:   Final diagnoses:  [R55] Near syncope  [R53.1] Weakness (Primary)  [R25.1] Shaking          ED Disposition Condition    Discharge  Stable        ED Prescriptions     None        Follow-up Information     Follow up With Specialties Details Why Contact Info    Follow up with your primary MD in 3-5 days if not improved.  Return to ED for worsening symptoms.               Camilo Clayton MD  07/02/22 3929

## 2022-07-10 ENCOUNTER — HOSPITAL ENCOUNTER (EMERGENCY)
Facility: HOSPITAL | Age: 22
Discharge: HOME | End: 2022-07-10
Attending: EMERGENCY MEDICINE
Payer: MEDICAID

## 2022-07-10 VITALS
HEART RATE: 81 BPM | OXYGEN SATURATION: 99 % | HEIGHT: 62 IN | SYSTOLIC BLOOD PRESSURE: 114 MMHG | BODY MASS INDEX: 46.93 KG/M2 | RESPIRATION RATE: 18 BRPM | TEMPERATURE: 98 F | WEIGHT: 255 LBS | DIASTOLIC BLOOD PRESSURE: 80 MMHG

## 2022-07-10 DIAGNOSIS — J10.1 INFLUENZA A: Primary | ICD-10-CM

## 2022-07-10 LAB
FLUAV AG UPPER RESP QL IA.RAPID: DETECTED
FLUBV AG UPPER RESP QL IA.RAPID: NOT DETECTED
SARS-COV-2 RNA RESP QL NAA+PROBE: NOT DETECTED

## 2022-07-10 PROCEDURE — 87636 SARSCOV2 & INF A&B AMP PRB: CPT | Performed by: EMERGENCY MEDICINE

## 2022-07-10 PROCEDURE — 99283 EMERGENCY DEPT VISIT LOW MDM: CPT | Mod: 25

## 2022-07-10 RX ORDER — OSELTAMIVIR PHOSPHATE 75 MG/1
75 CAPSULE ORAL 2 TIMES DAILY
Qty: 10 CAPSULE | Refills: 0 | Status: SHIPPED | OUTPATIENT
Start: 2022-07-10 | End: 2022-07-15

## 2022-07-10 NOTE — Clinical Note
"Bebe"Yanet Mosley was seen and treated in our emergency department on 7/10/2022.  She may return to work on 07/17/2022.       If you have any questions or concerns, please don't hesitate to call.      Camilo Clayton MD"

## 2022-07-10 NOTE — ED PROVIDER NOTES
Encounter Date: 7/10/2022       History     Chief Complaint   Patient presents with    Cough     Pt c/o of bodyaches, ha, and cough x few days, also reports dizziness      The history is provided by the patient. No  was used.   Cough  This is a new problem. The problem occurs hourly. The cough is non-productive. Maximum temperature: subjective. The fever has been present for 1 to 2 days. Associated symptoms include myalgias. Pertinent negatives include no chest pain, no sore throat and no shortness of breath.   Also notes extreme fatigue.    Review of patient's allergies indicates:  No Known Allergies  No past medical history on file. none  No past surgical history on file.  noncontributory  No family history on file.     Social History     Tobacco Use    Smoking status: Never Smoker    Smokeless tobacco: Never Used     Review of Systems   Constitutional: Negative for fever.   HENT: Negative for sore throat.    Respiratory: Positive for cough. Negative for shortness of breath.    Cardiovascular: Negative for chest pain.   Gastrointestinal: Negative for nausea.   Genitourinary: Negative for dysuria.   Musculoskeletal: Positive for myalgias. Negative for back pain.   Skin: Negative for rash.   Neurological: Negative for weakness.   Hematological: Does not bruise/bleed easily.       Physical Exam     Initial Vitals [07/10/22 0746]   BP Pulse Resp Temp SpO2   114/80 81 18 97.7 °F (36.5 °C) 99 %      MAP       --         Physical Exam    Nursing note and vitals reviewed.  Constitutional: She appears well-developed and well-nourished.   HENT:   Head: Normocephalic and atraumatic.   Right Ear: External ear normal.   Left Ear: External ear normal.   Eyes: Conjunctivae and EOM are normal. Pupils are equal, round, and reactive to light.   Neck: Neck supple.   Normal range of motion.  Cardiovascular: Normal rate, regular rhythm, normal heart sounds and intact distal pulses.   Pulmonary/Chest: Breath  sounds normal.   Abdominal: Abdomen is soft. Bowel sounds are normal.   Musculoskeletal:         General: Normal range of motion.      Cervical back: Normal range of motion and neck supple.     Neurological: She is alert and oriented to person, place, and time. GCS score is 15. GCS eye subscore is 4. GCS verbal subscore is 5. GCS motor subscore is 6.   Skin: Skin is warm and dry. Capillary refill takes less than 2 seconds.   Psychiatric: She has a normal mood and affect. Her behavior is normal. Judgment and thought content normal.         ED Course   Procedures  Labs Reviewed   COVID/FLU A&B PCR - Abnormal; Notable for the following components:       Result Value    Influenza A PCR Detected (*)     All other components within normal limits          Imaging Results    None          Medications - No data to display                       Clinical Impression:   Final diagnoses:  [J10.1] Influenza A (Primary)          ED Disposition Condition    Discharge Stable        ED Prescriptions     Medication Sig Dispense Start Date End Date Auth. Provider    oseltamivir (TAMIFLU) 75 MG capsule Take 1 capsule (75 mg total) by mouth 2 (two) times daily. for 5 days 10 capsule 7/10/2022 7/15/2022 Camilo Clayton MD        Follow-up Information     Follow up With Specialties Details Why Contact Info    Follow up with your primary MD in 3-5 days if not improved.  Return to ED for worsening symptoms.               Camilo Clayton MD  07/10/22 0902

## 2022-09-09 ENCOUNTER — HOSPITAL ENCOUNTER (EMERGENCY)
Facility: HOSPITAL | Age: 22
Discharge: HOME | End: 2022-09-09
Attending: STUDENT IN AN ORGANIZED HEALTH CARE EDUCATION/TRAINING PROGRAM
Payer: MEDICAID

## 2022-09-09 VITALS
RESPIRATION RATE: 18 BRPM | OXYGEN SATURATION: 99 % | HEIGHT: 61 IN | WEIGHT: 250 LBS | SYSTOLIC BLOOD PRESSURE: 120 MMHG | BODY MASS INDEX: 47.2 KG/M2 | DIASTOLIC BLOOD PRESSURE: 72 MMHG | TEMPERATURE: 98 F | HEART RATE: 75 BPM

## 2022-09-09 DIAGNOSIS — S29.011A PECTORALIS MUSCLE STRAIN, INITIAL ENCOUNTER: Primary | ICD-10-CM

## 2022-09-09 LAB — B-HCG SERPL QL: NEGATIVE

## 2022-09-09 PROCEDURE — 99283 EMERGENCY DEPT VISIT LOW MDM: CPT

## 2022-09-09 PROCEDURE — 81025 URINE PREGNANCY TEST: CPT | Performed by: STUDENT IN AN ORGANIZED HEALTH CARE EDUCATION/TRAINING PROGRAM

## 2022-09-09 PROCEDURE — 25000003 PHARM REV CODE 250: Performed by: STUDENT IN AN ORGANIZED HEALTH CARE EDUCATION/TRAINING PROGRAM

## 2022-09-09 RX ORDER — IBUPROFEN 600 MG/1
600 TABLET ORAL
Status: COMPLETED | OUTPATIENT
Start: 2022-09-09 | End: 2022-09-09

## 2022-09-09 RX ADMIN — IBUPROFEN 600 MG: 600 TABLET ORAL at 07:09

## 2022-09-09 NOTE — ED TRIAGE NOTES
Pt to er c/o chestpain onset two days ago, describes as pressure. Pt admits to weight lifting four days ago.

## 2022-09-10 NOTE — ED PROVIDER NOTES
Encounter Date: 9/9/2022       History     Chief Complaint   Patient presents with    Chest Pain     Pt to er c/o chestpain onset two days ago, describes as pressure. Pt admits to weight lifting four days ago.     HPI    This is a 22-year-old female who presents emergency department for chest pain as started approximately 3-4 days ago.  Patient states that she started working out lifting weights at started having some pain today after.  Patient states the pain is improving significantly and currently has no pain.  States it is an aching type of pain.  No cough or shortness of breath.  No worsening of pain on exertion.  States that if she lifts weights it hurts again.  Patient states that she want to get checked out because she never had this type of pain before.    Review of patient's allergies indicates:  No Known Allergies  No past medical history on file.  No past surgical history on file.  No family history on file.  Social History     Tobacco Use    Smoking status: Never    Smokeless tobacco: Never     Review of Systems   Constitutional:  Negative for fever.   Respiratory:  Negative for cough, shortness of breath and stridor.    Cardiovascular:  Positive for chest pain. Negative for palpitations and leg swelling.   Gastrointestinal:  Negative for abdominal pain.   Musculoskeletal:  Negative for back pain.   All other systems reviewed and are negative.    Physical Exam     Initial Vitals [09/09/22 1850]   BP Pulse Resp Temp SpO2   120/72 75 18 97.9 °F (36.6 °C) 99 %      MAP       --         Physical Exam    Nursing note and vitals reviewed.  Constitutional: She appears well-developed and well-nourished. No distress.   Cardiovascular:  Normal rate and regular rhythm.           Pulmonary/Chest: Breath sounds normal. No respiratory distress. She exhibits tenderness (Mild tenderness to palpation to the pectoralis bilaterally.).   Abdominal: Abdomen is soft. Bowel sounds are normal. There is no abdominal tenderness.    Musculoskeletal:         General: No tenderness. Normal range of motion.     Neurological: She is alert and oriented to person, place, and time. She has normal reflexes. GCS score is 15. GCS eye subscore is 4. GCS verbal subscore is 5. GCS motor subscore is 6.   Skin: Skin is warm. Capillary refill takes less than 2 seconds.   Psychiatric: She has a normal mood and affect. Thought content normal.       ED Course   Procedures  Labs Reviewed   PREGNANCY TEST, URINE RAPID - Normal          Imaging Results    None          Medications   ibuprofen tablet 600 mg (has no administration in time range)     Medical Decision Making:   Initial Assessment:   22-year-old female presents emergency department for chest achiness  Differential Diagnosis:   Muscle spasm, muscle strain, costochondritis, ACS, PE  Of note, patient states that she has to leave the emergency department within 30 minutes because she has to  her children.  States she does not want to have any blood work or EKG done because she does not have time to wait.      Patient with no active pain.  Mild tenderness with palpation to the [place] muscle.  She wants just to be treated with some ibuprofen and will return if symptoms worsen.  Strict ER precautions were given.           ED Course as of 09/09/22 1934   Fri Sep 09, 2022   1933 Patient states that she still just wants some ibuprofen.  Informed patient that I am unable to rule out any other etiology of her pain without blood work or EKG or chest x-ray.  She states understanding.  She states that she thinks she is just sore and not think about taking any ibuprofen.  Strict ER precautions given patient states understanding.  She states she return if anything changes. [BS]      ED Course User Index  [BS] Salo Khan MD             Clinical Impression:   Final diagnoses:  [S29.011A] Pectoralis muscle strain, initial encounter (Primary)      ED Disposition Condition    Discharge Stable          ED  Prescriptions    None       Follow-up Information       Follow up With Specialties Details Why Contact Info    [facility] - Emergency Dept Emergency Medicine Go to  If symptoms worsen [id number]  [facility] [id number] [phone]             Salo Khan MD  09/09/22 1937

## 2022-09-13 PROCEDURE — 99283 EMERGENCY DEPT VISIT LOW MDM: CPT

## 2022-09-14 ENCOUNTER — HOSPITAL ENCOUNTER (EMERGENCY)
Facility: HOSPITAL | Age: 22
Discharge: HOME | End: 2022-09-14
Attending: EMERGENCY MEDICINE
Payer: MEDICAID

## 2022-09-14 VITALS
HEART RATE: 67 BPM | RESPIRATION RATE: 18 BRPM | OXYGEN SATURATION: 100 % | TEMPERATURE: 98 F | WEIGHT: 250 LBS | BODY MASS INDEX: 47.2 KG/M2 | DIASTOLIC BLOOD PRESSURE: 85 MMHG | SYSTOLIC BLOOD PRESSURE: 132 MMHG | HEIGHT: 61 IN

## 2022-09-14 DIAGNOSIS — R10.2 FEMALE PELVIC PAIN: Primary | ICD-10-CM

## 2022-09-14 RX ORDER — HYDROCODONE BITARTRATE AND ACETAMINOPHEN 5; 325 MG/1; MG/1
1 TABLET ORAL EVERY 6 HOURS PRN
Qty: 20 TABLET | Refills: 0 | Status: SHIPPED | OUTPATIENT
Start: 2022-09-14 | End: 2022-09-19

## 2022-09-14 NOTE — ED PROVIDER NOTES
Encounter Date: 2022       History     Chief Complaint   Patient presents with    Abdominal Pain     The history is provided by the patient. No  was used.   Abdominal Pain  The current episode started yesterday. The problem has been gradually improving. The abdominal pain is located in the suprapubic region. The abdominal pain does not radiate. The abdominal pain is relieved by nothing. The other symptoms of the illness include nausea. The other symptoms of the illness do not include fever, shortness of breath, vomiting, diarrhea or dysuria.   Nausea began yesterday.   The patient states that she believes she is currently not pregnant. The patient has not had a change in bowel habit. Symptoms associated with the illness do not include hematuria or back pain.   States cramping began after intercourse with boyfriend.  Had been on Depo-Provera, but last injection was about 10 months ago.  Has had 2 menses since, both irregular.  Scheduled for IUD placement in November.    Review of patient's allergies indicates:  No Known Allergies  No past medical history on file. none  No past surgical history on file.   No family history on file.  Social History     Tobacco Use    Smoking status: Never    Smokeless tobacco: Never     Review of Systems   Constitutional:  Negative for fever.   HENT:  Negative for sore throat.    Respiratory:  Negative for shortness of breath.    Cardiovascular:  Negative for chest pain.   Gastrointestinal:  Positive for abdominal pain and nausea. Negative for diarrhea and vomiting.   Genitourinary:  Negative for dysuria and hematuria.   Musculoskeletal:  Negative for back pain.   Skin:  Negative for rash.   Neurological:  Negative for weakness.   Hematological:  Does not bruise/bleed easily.     Physical Exam     Initial Vitals [22 0001]   BP Pulse Resp Temp SpO2   132/85 67 18 98.1 °F (36.7 °C) 100 %      MAP       --         Physical Exam    Nursing note and  vitals reviewed.  Constitutional: She appears well-developed and well-nourished.   HENT:   Head: Normocephalic and atraumatic.   Right Ear: External ear normal.   Left Ear: External ear normal.   Eyes: Conjunctivae and EOM are normal. Pupils are equal, round, and reactive to light.   Neck: Neck supple.   Normal range of motion.  Cardiovascular:  Normal rate, regular rhythm, normal heart sounds and intact distal pulses.           Pulmonary/Chest: Breath sounds normal.   Abdominal: Abdomen is soft. Bowel sounds are normal.   Musculoskeletal:         General: Normal range of motion.      Cervical back: Normal range of motion and neck supple.     Neurological: She is alert and oriented to person, place, and time. GCS score is 15. GCS eye subscore is 4. GCS verbal subscore is 5. GCS motor subscore is 6.   Skin: Skin is warm and dry. Capillary refill takes less than 2 seconds.   Psychiatric: She has a normal mood and affect. Her behavior is normal. Judgment and thought content normal.       ED Course   Procedures  Labs Reviewed   CHLAMYDIA/GONORRHOEAE(GC), PCR   URINALYSIS, REFLEX TO URINE CULTURE   PREGNANCY TEST, URINE RAPID          Imaging Results    None          Medications - No data to display                  Pt unable to produce urine specimen and would like to just go home with symptomatic treatment.  States she will return if symptoms worsen.         Clinical Impression:   Final diagnoses:  [R10.2] Female pelvic pain (Primary)      ED Disposition Condition    Discharge Stable          ED Prescriptions       Medication Sig Dispense Start Date End Date Auth. Provider    HYDROcodone-acetaminophen (NORCO) 5-325 mg per tablet Take 1 tablet by mouth every 6 (six) hours as needed for Pain. 20 tablet 9/14/2022 9/19/2022 Camilo Clayton MD          Follow-up Information       Follow up With Specialties Details Why Contact Info    Follow up with your primary MD in 3-5 days if not improved.  Return to ED for  worsening symptoms.                 Camilo Clayton MD  09/14/22 4534

## 2022-09-16 ENCOUNTER — PATIENT OUTREACH (OUTPATIENT)
Dept: EMERGENCY MEDICINE | Facility: HOSPITAL | Age: 22
End: 2022-09-16
Payer: MEDICAID

## 2022-09-16 NOTE — PROGRESS NOTES
.Why Should I Have My Own Doctor or Nurse Practitioner (PCP) to Take Care of Me  What is a PCP (Primary Care Provider)?    A primary care provider is a doctor or nurse practitioner who you can call for an appointment and will see you when you are sick.    You will also be seen at scheduled appointment times during the year to check on your diabetes, or high blood pressure, or heart disease.    Why see the same PCP (doctor/nurse practitioner)?    You can be seen faster when you are sick           You, the PCP (doctor/nurse practitioner) and the office staff get to know each other; you begin to trust them to care for you. You take part in your health choices.   All of you together are a team.    Your medicine is looked at every time you visit, to be sure you are taking the medicine, as the PCP (doctor/nurse practitioner) ordered.    Your PCP (doctor/nurse practitioner) and their staff help keep you healthy and out of the hospital.  They can catch sicknesses earlier by ordering tests once a year to stop or prevent the sickness from getting worse.      Your PCP (doctor/nurse practitioner) can send you to providers who specialize (heart/bone/lung) if you need.  They and their office staff help keep track of your seeing other providers (doctors/nurse practitioners) and tests (CT/ MRIs/ X Rays)) taken.        PCPs want you to stay healthy.  Let us care for you.       .DASH Meal Plan  (Healthy eating plan)    Why use this meal plan?    This healthy meal plan lowers blood pressure.  This meal plan lowers the chance of you getting Diabetes, heart disease and stroke.  This meal plan also helps you lose weight.    DASH balanced meal plannin or more servings of fruits and vegetables every day.  At each meal, try to fill half of your plated with fruits and vegetables                    Eat 6-8 servings of whole grains every day.  Whole grains are:  Brown rice, oatmeal, whole wheat bread, whole grain, low salt cereals, [place]  bread, whole wheat flour tortillas                5 ounces of meat, chicken, or fish each day (3-ounce size of serving of meat is the same size as a deck of playing cards)  Fish like sardines, salmon and mackerel are also good for your heart.            2 servings of low-fat dairy each day (Milk, cottage cheese, yogurt)          Do Not:  Use More than 1,500 milligrams of salt a day if you have high blood pressure (2/3 teaspoon)    You would look at labels and total milligrams of salt per day        Do Not Add salt when cooking      Do Not Salt food before eating    Do Not Eat fried foods  Do Not Cook using butter, stick margarine, lard, shortening, coconut oil    Do Not Buy regular canned vegetables, pickles, or olives (too much salt; use low salt or no salt)  Do Not Do not buy premade or frozen meals    Do Not Eat fast food/buffet           Speak with your Doctor/Nurse practitioner about exercising 30 minutes a day          Modified from DASH eating plan education .        What Do I Do If I Wake Up Sick                                                     If you wake up sick, or you start to fill sick during the day, try these tips to get care and start to feel better soon.                                                                                                                              As soon as you start to feel bad, call your doctor's office and ask for same day or next day visit appointment.        If you cannot be seen with your doctor's office within 24 hours, you can go to an urgent care and be seen.          How to stay well:     Take your medicine as ordered by your doctor      Fill your Medicine before you run out      Exercise       Enjoy walking in the sunlight daily  Keep your scheduled clinic appointments      Keep you scheduled yearly wellness visits

## 2022-09-21 ENCOUNTER — HISTORICAL (OUTPATIENT)
Dept: ADMINISTRATIVE | Facility: HOSPITAL | Age: 22
End: 2022-09-21
Payer: MEDICAID

## 2022-10-25 ENCOUNTER — PATIENT OUTREACH (OUTPATIENT)
Dept: EMERGENCY MEDICINE | Facility: HOSPITAL | Age: 22
End: 2022-10-25

## 2022-11-11 ENCOUNTER — OFFICE VISIT (OUTPATIENT)
Dept: GYNECOLOGY | Facility: CLINIC | Age: 22
End: 2022-11-11
Payer: MEDICAID

## 2022-11-11 VITALS
WEIGHT: 270 LBS | OXYGEN SATURATION: 96 % | HEART RATE: 70 BPM | RESPIRATION RATE: 18 BRPM | DIASTOLIC BLOOD PRESSURE: 72 MMHG | TEMPERATURE: 98 F | SYSTOLIC BLOOD PRESSURE: 107 MMHG | BODY MASS INDEX: 50.98 KG/M2 | HEIGHT: 61 IN

## 2022-11-11 DIAGNOSIS — Z30.9 ENCOUNTER FOR CONTRACEPTIVE MANAGEMENT, UNSPECIFIED TYPE: ICD-10-CM

## 2022-11-11 DIAGNOSIS — E66.9 OBESITY, UNSPECIFIED CLASSIFICATION, UNSPECIFIED OBESITY TYPE, UNSPECIFIED WHETHER SERIOUS COMORBIDITY PRESENT: ICD-10-CM

## 2022-11-11 DIAGNOSIS — Z11.3 ROUTINE SCREENING FOR STI (SEXUALLY TRANSMITTED INFECTION): ICD-10-CM

## 2022-11-11 DIAGNOSIS — Z01.419 ENCOUNTER FOR ANNUAL ROUTINE GYNECOLOGICAL EXAMINATION: Primary | ICD-10-CM

## 2022-11-11 LAB
C TRACH DNA SPEC QL NAA+PROBE: DETECTED
CLUE CELLS VAG QL WET PREP: ABNORMAL
N GONORRHOEA DNA SPEC QL NAA+PROBE: NOT DETECTED
T VAGINALIS VAG QL WET PREP: ABNORMAL
WBC #/AREA VAG WET PREP: ABNORMAL
YEAST SPEC QL WET PREP: ABNORMAL

## 2022-11-11 PROCEDURE — 87491 CHLMYD TRACH DNA AMP PROBE: CPT | Performed by: NURSE PRACTITIONER

## 2022-11-11 PROCEDURE — 1160F PR REVIEW ALL MEDS BY PRESCRIBER/CLIN PHARMACIST DOCUMENTED: ICD-10-PCS | Mod: CPTII,,, | Performed by: NURSE PRACTITIONER

## 2022-11-11 PROCEDURE — 1160F RVW MEDS BY RX/DR IN RCRD: CPT | Mod: CPTII,,, | Performed by: NURSE PRACTITIONER

## 2022-11-11 PROCEDURE — 3008F BODY MASS INDEX DOCD: CPT | Mod: CPTII,,, | Performed by: NURSE PRACTITIONER

## 2022-11-11 PROCEDURE — 87210 SMEAR WET MOUNT SALINE/INK: CPT | Performed by: NURSE PRACTITIONER

## 2022-11-11 PROCEDURE — 99214 OFFICE O/P EST MOD 30 MIN: CPT | Mod: PBBFAC | Performed by: NURSE PRACTITIONER

## 2022-11-11 PROCEDURE — 3078F DIAST BP <80 MM HG: CPT | Mod: CPTII,,, | Performed by: NURSE PRACTITIONER

## 2022-11-11 PROCEDURE — 3078F PR MOST RECENT DIASTOLIC BLOOD PRESSURE < 80 MM HG: ICD-10-PCS | Mod: CPTII,,, | Performed by: NURSE PRACTITIONER

## 2022-11-11 PROCEDURE — 3074F PR MOST RECENT SYSTOLIC BLOOD PRESSURE < 130 MM HG: ICD-10-PCS | Mod: CPTII,,, | Performed by: NURSE PRACTITIONER

## 2022-11-11 PROCEDURE — 87591 N.GONORRHOEAE DNA AMP PROB: CPT | Performed by: NURSE PRACTITIONER

## 2022-11-11 PROCEDURE — 99395 PR PREVENTIVE VISIT,EST,18-39: ICD-10-PCS | Mod: S$PBB,,, | Performed by: NURSE PRACTITIONER

## 2022-11-11 PROCEDURE — 1159F MED LIST DOCD IN RCRD: CPT | Mod: CPTII,,, | Performed by: NURSE PRACTITIONER

## 2022-11-11 PROCEDURE — 99395 PREV VISIT EST AGE 18-39: CPT | Mod: S$PBB,,, | Performed by: NURSE PRACTITIONER

## 2022-11-11 PROCEDURE — 3074F SYST BP LT 130 MM HG: CPT | Mod: CPTII,,, | Performed by: NURSE PRACTITIONER

## 2022-11-11 PROCEDURE — 3008F PR BODY MASS INDEX (BMI) DOCUMENTED: ICD-10-PCS | Mod: CPTII,,, | Performed by: NURSE PRACTITIONER

## 2022-11-11 PROCEDURE — 1159F PR MEDICATION LIST DOCUMENTED IN MEDICAL RECORD: ICD-10-PCS | Mod: CPTII,,, | Performed by: NURSE PRACTITIONER

## 2022-11-11 NOTE — PROGRESS NOTES
"  Subjective:       Patient ID: Bebe Mosley is a 22 y.o. female.    Chief Complaint:  Well Woman    History of Present Illness  The patient  here for annual exam. Her LMP was 22. Period last 5 days and changes pads 3x/day. Pt had intercourse last night. Desires IUD. Used Depo in past. Denies history of abnormal paps. Last pap -NIL.  Denies breast or urinary complaints. Denies pelvic pain or discharge. Pt reports no STIs in the past and desires testing. Declines HPV vaccine. Currently not on contraception. Denies tobacco use. Dep. screening 0. Denies fly hx of breast, ovarian, uterine or colon cancer.     GYN & OB History  Patient's last menstrual period was 2022.   Date of Last Pap: 2020    Review of patient's allergies indicates:  No Known Allergies  History reviewed. No pertinent past medical history.  OB History    Para Term  AB Living   1 1           SAB IAB Ectopic Multiple Live Births                  # Outcome Date GA Lbr Cody/2nd Weight Sex Delivery Anes PTL Lv   1 Para                 Review of Systems  Review of Systems    Negative except for pertinent findings for positives per HPI     Objective:    Physical Exam    /72 (BP Location: Left arm, Patient Position: Sitting, BP Method: Large (Automatic))   Pulse 70   Temp 98.4 °F (36.9 °C)   Resp 18   Ht 5' 1" (1.549 m)   Wt 122.5 kg (270 lb)   LMP 2022   SpO2 96%   BMI 51.02 kg/m²   GENERAL: Well-developed female in no acute distress.  SKIN: Normal to inspection, warm and intact.  BREASTS: No masses, lumps, discharge, tenderness.  VULVA: General appearance normal; external genitalia with no lesions or erythema.  VAGINA: Mucosa normal, pink, no discharge or lesions.  CERVIX: Grossly normal, pink, no erythema or discharge.  BIMANUAL EXAM: limited exam d/t body habitus. Maxime adnexa reveal no tenderness.  PSYCHIATRIC: Patient is oriented to person, place, and time. Mood and affect are " normal.    Assessment:       1. Encounter for annual routine gynecological examination    2. Routine screening for STI (sexually transmitted infection)    3. Encounter for contraceptive management, unspecified type    4. Obesity, unspecified classification, unspecified obesity type, unspecified whether serious comorbidity present         Plan:   Bebe was seen today for well woman.    Diagnoses and all orders for this visit:    Encounter for annual routine gynecological examination    Routine screening for STI (sexually transmitted infection)  -     Chlamydia/GC, PCR  -     Wet Prep, Genital    Encounter for contraceptive management, unspecified type  -     Pregnancy, urine rapid; Future    Obesity, unspecified classification, unspecified obesity type, unspecified whether serious comorbidity present  Pelvic today, pap utd per ACOG  Wet prep and g/c  UPT in lab at next cycle. If negative, will send OCPs.   Will schedule for Kyleena as requested in next available in early Jan.   Healthy lifestyle choices. Importance of maintaining a healthy BMI. Healthy diet including limiting fast foods, processed foods, foods containing high amounts of saturated fats or high sodium content. Recommend low carb, low fat diet rich in lean meat and fish, non starchy vegetables, fruits and good fat while maintaining portion control. Limit sugar intake. Recommended plenty of water, avoiding carbonated beverages and high fructose corn syrup. Regular cardiovascular exercise, at least 150 minutes of cardiovascular exercise (at least 30 mins 5x/week).  Follow up in about 1 year (around 11/11/2023) for annual exam.

## 2022-11-14 ENCOUNTER — TELEPHONE (OUTPATIENT)
Dept: GYNECOLOGY | Facility: CLINIC | Age: 22
End: 2022-11-14
Payer: MEDICAID

## 2022-11-14 DIAGNOSIS — A74.9 CHLAMYDIA: Primary | ICD-10-CM

## 2022-11-14 RX ORDER — DOXYCYCLINE HYCLATE 100 MG
100 TABLET ORAL 2 TIMES DAILY
Qty: 14 TABLET | Refills: 0 | Status: SHIPPED | OUTPATIENT
Start: 2022-11-14 | End: 2022-11-21

## 2022-11-14 NOTE — TELEPHONE ENCOUNTER
Called and informed. Patient verbalized understanding.     Scheduled nurse visit for 02/16/22 for re-test per Olivia Ch NP request. Patient verbalized understanding and will attend appt.     I also informed pt that she will need to test negative prior to insertion of IUD as it is a foreign body being inserted into her uterus. Patient verbalized understanding.

## 2022-11-14 NOTE — TELEPHONE ENCOUNTER
Testing resulted positive for chlamydia. Rx sent to pharmacy on file. Inform pt that partner needs to seek treatment. Instruct pt that she needs to refrain from intercourse for 7 days after both her and partner are treated. Encourage safe sex practices with condom use.     Please schedule retest in 3 months.    Inform pt she will need to test negative prior to insertion of IUD as it is a foreign body being inserted into her uterus.

## 2022-11-17 ENCOUNTER — TELEPHONE (OUTPATIENT)
Dept: GYNECOLOGY | Facility: CLINIC | Age: 22
End: 2022-11-17

## 2022-11-17 NOTE — TELEPHONE ENCOUNTER
----- Message from Simi Sullivan sent at 11/17/2022  2:10 PM CST -----  Regarding: Results  Pt called clinic requesting a nurse call her to explain the results of her last PAP/STD Panel

## 2022-12-05 ENCOUNTER — TELEPHONE (OUTPATIENT)
Dept: GYNECOLOGY | Facility: CLINIC | Age: 22
End: 2022-12-05
Payer: MEDICAID

## 2023-02-02 ENCOUNTER — TELEPHONE (OUTPATIENT)
Dept: GYNECOLOGY | Facility: CLINIC | Age: 23
End: 2023-02-02
Payer: MEDICAID

## 2023-02-02 NOTE — TELEPHONE ENCOUNTER
----- Message from ANGEL LUIS Meraz sent at 2/2/2023  2:45 PM CST -----  Regarding: Kyleena IUD  Please contact pt, she has STI retest on 2/16/23. At last visit she was interested in Kyleena IUD, is she still interested? If so, when was last and next cycle so that we can start looking at dates.

## 2023-02-02 NOTE — TELEPHONE ENCOUNTER
Spoke to patient to confirm that she is still interested in the Kyleena IUD. Patient is still interested. Educated patient on safe sex practices. Informed of appointment date and time. Patient was advised to take advil or tylenol before visit to help with cramping. Patient had a clear understanding and had no questions or concerns at this time.

## 2023-02-10 ENCOUNTER — TELEPHONE (OUTPATIENT)
Dept: GYNECOLOGY | Facility: CLINIC | Age: 23
End: 2023-02-10
Payer: MEDICAID

## 2023-02-10 NOTE — TELEPHONE ENCOUNTER
Please call and schedule pt for Kyleena IUD insertion on 4/5/23 @ 1:30 in procedure slot.    Thanks,

## 2023-04-05 ENCOUNTER — OFFICE VISIT (OUTPATIENT)
Dept: GYNECOLOGY | Facility: CLINIC | Age: 23
End: 2023-04-05
Payer: MEDICAID

## 2023-04-05 VITALS
OXYGEN SATURATION: 99 % | TEMPERATURE: 98 F | HEART RATE: 75 BPM | DIASTOLIC BLOOD PRESSURE: 59 MMHG | HEIGHT: 61 IN | SYSTOLIC BLOOD PRESSURE: 103 MMHG | RESPIRATION RATE: 18 BRPM | BODY MASS INDEX: 50.53 KG/M2 | WEIGHT: 267.63 LBS

## 2023-04-05 DIAGNOSIS — A74.9 CHLAMYDIA: Primary | ICD-10-CM

## 2023-04-05 LAB
C TRACH DNA SPEC QL NAA+PROBE: NOT DETECTED
CLUE CELLS VAG QL WET PREP: NORMAL
N GONORRHOEA DNA SPEC QL NAA+PROBE: NOT DETECTED
T VAGINALIS VAG QL WET PREP: NORMAL
WBC #/AREA VAG WET PREP: NORMAL
YEAST SPEC QL WET PREP: NORMAL

## 2023-04-05 PROCEDURE — 3074F SYST BP LT 130 MM HG: CPT | Mod: CPTII,,, | Performed by: NURSE PRACTITIONER

## 2023-04-05 PROCEDURE — 87591 N.GONORRHOEAE DNA AMP PROB: CPT | Performed by: NURSE PRACTITIONER

## 2023-04-05 PROCEDURE — 99213 PR OFFICE/OUTPT VISIT, EST, LEVL III, 20-29 MIN: ICD-10-PCS | Mod: S$PBB,,, | Performed by: NURSE PRACTITIONER

## 2023-04-05 PROCEDURE — 87210 SMEAR WET MOUNT SALINE/INK: CPT | Performed by: NURSE PRACTITIONER

## 2023-04-05 PROCEDURE — 3008F PR BODY MASS INDEX (BMI) DOCUMENTED: ICD-10-PCS | Mod: CPTII,,, | Performed by: NURSE PRACTITIONER

## 2023-04-05 PROCEDURE — 1159F MED LIST DOCD IN RCRD: CPT | Mod: CPTII,,, | Performed by: NURSE PRACTITIONER

## 2023-04-05 PROCEDURE — 3074F PR MOST RECENT SYSTOLIC BLOOD PRESSURE < 130 MM HG: ICD-10-PCS | Mod: CPTII,,, | Performed by: NURSE PRACTITIONER

## 2023-04-05 PROCEDURE — 3008F BODY MASS INDEX DOCD: CPT | Mod: CPTII,,, | Performed by: NURSE PRACTITIONER

## 2023-04-05 PROCEDURE — 1159F PR MEDICATION LIST DOCUMENTED IN MEDICAL RECORD: ICD-10-PCS | Mod: CPTII,,, | Performed by: NURSE PRACTITIONER

## 2023-04-05 PROCEDURE — 99213 OFFICE O/P EST LOW 20 MIN: CPT | Mod: S$PBB,,, | Performed by: NURSE PRACTITIONER

## 2023-04-05 PROCEDURE — 3078F PR MOST RECENT DIASTOLIC BLOOD PRESSURE < 80 MM HG: ICD-10-PCS | Mod: CPTII,,, | Performed by: NURSE PRACTITIONER

## 2023-04-05 PROCEDURE — 3078F DIAST BP <80 MM HG: CPT | Mod: CPTII,,, | Performed by: NURSE PRACTITIONER

## 2023-04-05 PROCEDURE — 99213 OFFICE O/P EST LOW 20 MIN: CPT | Mod: PBBFAC | Performed by: NURSE PRACTITIONER

## 2023-04-05 RX ORDER — PHENTERMINE HYDROCHLORIDE 37.5 MG/1
37.5 TABLET ORAL
COMMUNITY
Start: 2023-03-21

## 2023-04-05 NOTE — LETTER
April 5, 2023      [facility]  [address] [facility] [address]  Phone: [phone]       Patient: Bebe Mosley   YOB: 2000  Date of Visit: 04/05/2023    To Whom It May Concern:    Jones Mosley  was at [facility] on 04/05/2023. The patient may return to work/school on 04/06/2023 with no restrictions. If you have any questions or concerns, or if I can be of further assistance, please do not hesitate to contact me.    Sincerely,    Dima Baker NP

## 2023-04-05 NOTE — PROGRESS NOTES
"  Subjective:       Patient ID: Bebe Mosley is a 22 y.o. female.    Chief Complaint:  STI retest      History of Present Illness  Pt presents today for IUD insertion, upon assessment it was realized that pt never returned for retesting for Chlamydia. Pt was no show for Feb retest. States "my partner said he was treated". Will proceed with retest today and then schedule for IUD insertion pending results.     GYN & OB History  Patient's last menstrual period was 2023 (exact date).   Date of Last Pap: 2020    Review of patient's allergies indicates:   Allergen Reactions    Pecan nut Anaphylaxis    Fish containing products Rash     History reviewed. No pertinent past medical history.  OB History    Para Term  AB Living   1 1           SAB IAB Ectopic Multiple Live Births                  # Outcome Date GA Lbr Cody/2nd Weight Sex Delivery Anes PTL Lv   1 Para                 Review of Systems  Review of Systems    Negative except for pertinent findings for positives per HPI     Objective:    Physical Exam    BP (!) 103/59 (BP Location: Left arm, Patient Position: Sitting, BP Method: Large (Automatic))   Pulse 75   Temp 97.7 °F (36.5 °C) (Oral)   Resp 18   Ht 5' 1" (1.549 m)   Wt 121.4 kg (267 lb 9.6 oz)   LMP 2023 (Exact Date)   SpO2 99%   BMI 50.56 kg/m²   GENERAL: Well-developed female in no acute distress.  SKIN: Normal to inspection, warm and intact.  VULVA: General appearance normal; external genitalia with no lesions or erythema.  VAGINA: Mucosa normal, pink, no abnormal discharge or lesions. Blood in vaginal vault.  CERVIX: Grossly normal, pink, no erythema or abnormal discharge.  PSYCHIATRIC: Patient is oriented to person, place, and time. Mood and affect are normal.    Assessment:       1. Chlamydia         Plan:   Diagnoses and all orders for this visit:    Chlamydia  -     Chlamydia/GC, PCR  -     Wet Prep, Genital    G/C, wet prep  Will schedule for IUD removal " pending results.  Follow up for annual exam.

## 2023-04-18 ENCOUNTER — OFFICE VISIT (OUTPATIENT)
Dept: URGENT CARE | Facility: CLINIC | Age: 23
End: 2023-04-18
Payer: MEDICAID

## 2023-04-18 VITALS
BODY MASS INDEX: 52.11 KG/M2 | SYSTOLIC BLOOD PRESSURE: 124 MMHG | OXYGEN SATURATION: 98 % | DIASTOLIC BLOOD PRESSURE: 83 MMHG | RESPIRATION RATE: 16 BRPM | HEIGHT: 61 IN | TEMPERATURE: 98 F | WEIGHT: 276 LBS | HEART RATE: 97 BPM

## 2023-04-18 DIAGNOSIS — N89.8 VAGINAL IRRITATION: Primary | ICD-10-CM

## 2023-04-18 LAB
BILIRUB UR QL STRIP: NEGATIVE
CLUE CELLS VAG QL WET PREP: NORMAL
GLUCOSE UR QL STRIP: NEGATIVE
KETONES UR QL STRIP: NEGATIVE
LEUKOCYTE ESTERASE UR QL STRIP: NEGATIVE
PH, POC UA: 6.5
POC BLOOD, URINE: NEGATIVE
POC NITRATES, URINE: NEGATIVE
PROT UR QL STRIP: NEGATIVE
SP GR UR STRIP: 1.02 (ref 1–1.03)
T VAGINALIS VAG QL WET PREP: NORMAL
UROBILINOGEN UR STRIP-ACNC: 0.2 (ref 0.1–1.1)
WBC #/AREA VAG WET PREP: NORMAL
YEAST SPEC QL WET PREP: NORMAL

## 2023-04-18 PROCEDURE — 81003 URINALYSIS AUTO W/O SCOPE: CPT | Mod: PBBFAC | Performed by: FAMILY MEDICINE

## 2023-04-18 PROCEDURE — 99213 PR OFFICE/OUTPT VISIT, EST, LEVL III, 20-29 MIN: ICD-10-PCS | Mod: S$PBB,,, | Performed by: FAMILY MEDICINE

## 2023-04-18 PROCEDURE — 99213 OFFICE O/P EST LOW 20 MIN: CPT | Mod: S$PBB,,, | Performed by: FAMILY MEDICINE

## 2023-04-18 PROCEDURE — 87210 SMEAR WET MOUNT SALINE/INK: CPT | Performed by: FAMILY MEDICINE

## 2023-04-18 PROCEDURE — 99213 OFFICE O/P EST LOW 20 MIN: CPT | Mod: PBBFAC | Performed by: FAMILY MEDICINE

## 2023-04-19 NOTE — PROGRESS NOTES
"Subjective:      Patient ID: Bebe Mosley is a 22 y.o. female.    Vitals:  height is 5' 1" (1.549 m) and weight is 125.2 kg (276 lb). Her oral temperature is 97.9 °F (36.6 °C). Her blood pressure is 124/83 and her pulse is 97. Her respiration is 16 and oxygen saturation is 98%.     Chief Complaint: Vaginal Discharge (Patient started using a different laundry detergent and developed vaginal irritation.) and vaginal irritation (Patient was seen by her GYN and had a pap, wet prep and STD testing on 4/4. She has not been sexually active since.)    Vaginal Discharge  The patient's primary symptoms include a genital odor and vaginal discharge. The patient's pertinent negatives include no genital itching, genital lesions, genital rash, missed menses, pelvic pain or vaginal bleeding. The current episode started in the past 7 days. Pertinent negatives include no abdominal pain, diarrhea, fever, flank pain, hematuria, rash or vomiting.     Constitution: Negative for fever.   Cardiovascular:  Negative for chest pain.   Respiratory:  Negative for shortness of breath.    Gastrointestinal:  Negative for abdominal pain, vomiting and diarrhea.   Genitourinary:  Positive for vaginal discharge. Negative for flank pain, bladder incontinence, hematuria, missed menses and pelvic pain.   Skin:  Negative for rash.   Neurological:  Negative for altered mental status.   Psychiatric/Behavioral:  Negative for altered mental status.     Objective:     Physical Exam   Constitutional:  Non-toxic appearance. She does not appear ill. No distress.   Neck: Neck supple.   Abdominal: Normal appearance. She exhibits no distension. flat abdomen There is no abdominal tenderness. There is no rebound, no guarding, no left CVA tenderness and no right CVA tenderness.   Neurological: no focal deficit. She is alert.   Skin: Skin is warm, dry and not diaphoretic.   Psychiatric: Her behavior is normal. Mood normal.   Nursing note and vitals " reviewed.    Assessment:     1. Vaginal irritation        Plan:       Vaginal irritation  -     POCT Urinalysis, Dipstick, Automated, W/O Scope  -     Wet Prep, Genital

## 2023-04-27 ENCOUNTER — HOSPITAL ENCOUNTER (EMERGENCY)
Facility: HOSPITAL | Age: 23
Discharge: LEFT BEFORE DISCHARGE | End: 2023-04-27
Payer: MEDICAID

## 2023-04-27 VITALS
HEIGHT: 62 IN | OXYGEN SATURATION: 100 % | BODY MASS INDEX: 49.69 KG/M2 | DIASTOLIC BLOOD PRESSURE: 87 MMHG | RESPIRATION RATE: 20 BRPM | HEART RATE: 63 BPM | TEMPERATURE: 98 F | SYSTOLIC BLOOD PRESSURE: 113 MMHG | WEIGHT: 270 LBS

## 2023-04-27 DIAGNOSIS — R07.9 CHEST PAIN: ICD-10-CM

## 2023-04-27 PROCEDURE — 93005 ELECTROCARDIOGRAM TRACING: CPT

## 2023-04-27 PROCEDURE — 99900041 HC LEFT WITHOUT BEING SEEN- EMERGENCY

## 2023-04-27 PROCEDURE — 93010 EKG 12-LEAD: ICD-10-PCS | Mod: ,,, | Performed by: INTERNAL MEDICINE

## 2023-04-27 PROCEDURE — 93010 ELECTROCARDIOGRAM REPORT: CPT | Mod: ,,, | Performed by: INTERNAL MEDICINE

## 2023-04-30 ENCOUNTER — TELEPHONE (OUTPATIENT)
Dept: URGENT CARE | Facility: CLINIC | Age: 23
End: 2023-04-30

## 2023-04-30 NOTE — TELEPHONE ENCOUNTER
Called patient, she states at her visit that MD stated he would send medication for BV if her test results were negative. Please advise.

## 2023-04-30 NOTE — TELEPHONE ENCOUNTER
----- Message from Laura Damon sent at 4/19/2023  3:57 PM CDT -----  Regarding: Results/ Meds  Patient called in with questions about neg results and still wants to be prescribed meds.3  [phone]

## 2023-05-01 RX ORDER — METRONIDAZOLE 500 MG/1
500 TABLET ORAL EVERY 12 HOURS
Qty: 14 TABLET | Refills: 0 | Status: SHIPPED | OUTPATIENT
Start: 2023-05-01 | End: 2023-05-10

## 2023-05-10 ENCOUNTER — OFFICE VISIT (OUTPATIENT)
Dept: GYNECOLOGY | Facility: CLINIC | Age: 23
End: 2023-05-10
Payer: MEDICAID

## 2023-05-10 VITALS
HEIGHT: 62 IN | OXYGEN SATURATION: 100 % | DIASTOLIC BLOOD PRESSURE: 71 MMHG | SYSTOLIC BLOOD PRESSURE: 115 MMHG | HEART RATE: 73 BPM | WEIGHT: 276.81 LBS | BODY MASS INDEX: 50.94 KG/M2 | RESPIRATION RATE: 20 BRPM | TEMPERATURE: 98 F

## 2023-05-10 DIAGNOSIS — Z30.018 ENCOUNTER FOR INITIAL PRESCRIPTION OF OTHER CONTRACEPTIVES: Primary | ICD-10-CM

## 2023-05-10 PROCEDURE — 58300 INSERT INTRAUTERINE DEVICE: CPT | Mod: PBBFAC | Performed by: NURSE PRACTITIONER

## 2023-05-10 PROCEDURE — 58300 INSERT INTRAUTERINE DEVICE: CPT | Mod: S$PBB,,, | Performed by: NURSE PRACTITIONER

## 2023-05-10 PROCEDURE — 58300 INSERT INTRAUTERINE DEVICE: CPT | Mod: PBBFAC,76 | Performed by: NURSE PRACTITIONER

## 2023-05-10 PROCEDURE — 58300 PR INSERT INTRAUTERINE DEVICE: ICD-10-PCS | Mod: S$PBB,,, | Performed by: NURSE PRACTITIONER

## 2023-05-10 PROCEDURE — 99213 OFFICE O/P EST LOW 20 MIN: CPT | Mod: PBBFAC | Performed by: NURSE PRACTITIONER

## 2023-05-10 RX ADMIN — LEVONORGESTREL 14 MCG: 19.5 INTRAUTERINE DEVICE INTRAUTERINE at 03:05

## 2023-05-10 NOTE — PROGRESS NOTES
Installed Kyleena Lot# [id number]  Exp 2024/05 and upon cutting the strings it came out. Patient then had the Leila installed Lot# [id number] Exp date 2024/05. Patient tolerated well with minimal bleeding and cramping. Patient left with no concerns or complaints. Patient informed if anything changes to call the clinic.

## 2023-05-10 NOTE — PROCEDURES
Insertion of IUD    Date/Time: 5/10/2023 1:30 PM  Performed by: ANGEL LUIS Meraz  Authorized by: ANGEL LUIS Meraz     Consent:     Consent obtained:  Verbal and written    Consent given by:  Patient    Procedure risks and benefits discussed: yes      Patient questions answered: yes      Patient agrees, verbalizes understanding, and wants to proceed: yes      Educational handouts given: yes      Instructions and paperwork completed: yes    Procedure:     Pelvic exam performed: yes      Negative GC/chlamydia test: yes      Negative urine pregnancy test: yes      Negative serum pregnancy test: no      Cervix cleaned and prepped: yes      Speculum placed in vagina: yes      Tenaculum applied to cervix: yes      Uterus sounded: yes      Uterus sound depth (cm):  8    IUD inserted with no complications: yes (Prior Kyleena inserted and expelled upon cutting string)      Strings trimmed: yes    Post-procedure:     Patient will follow up after next period: yes    Comments:      Procedure Name  IUD placement    Bimanual exam: uterus mobile, in midline position  Cervix: No lesions, no cervical motion tenderness.     Procedure Details  Urine pregnancy test was negative.  Informed consent was obtained. Consent forms were signed and witnessed. Time out performed.  The patient was placed in dorsal lithotomy position. A speculum was placed in the vagina and good visualization of the cervix was achieved. The cervix was swabbed with betadine x 3. Tenaculum was used to grasp anterior cervix and gentle traction applied. The uterus was sounded to 8 cm. Kyleena IUD was inserted and deployed without difficulty. Upon cutting IUD strings, IUD was expelled.     The patient was placed in dorsal lithotomy position. A speculum was placed in the vagina and good visualization of the cervix was achieved. The cervix was swabbed with betadine x 3. Tenaculum was used to grasp anterior cervix and gentle traction applied. The uterus was sounded  to 8 cm. Leila IUD was inserted and deployed without difficulty. The IUD string was visible and trimmed to 3 cm and all instruments were removed from the vagina. Tenaculum sites were noted to be hemostatic with use of Monsels. Patient tolerated the procedure well. Minimal EBL noted.     IUD Information: Leila  IUD tracking: [id number]  Exp: May 2024      Education:   - The patient was advised to call for any fever or for prolonged or severe pain or bleeding. She was advised to use OTC NSAIDs as needed for mild to moderate pain.  - RTC in 6 weeks for string check.  - Nothing in vagina for 2 weeks.

## 2023-05-16 ENCOUNTER — OFFICE VISIT (OUTPATIENT)
Dept: URGENT CARE | Facility: CLINIC | Age: 23
End: 2023-05-16
Payer: MEDICAID

## 2023-05-16 VITALS
HEART RATE: 70 BPM | BODY MASS INDEX: 52.24 KG/M2 | OXYGEN SATURATION: 98 % | RESPIRATION RATE: 20 BRPM | SYSTOLIC BLOOD PRESSURE: 101 MMHG | WEIGHT: 276.69 LBS | DIASTOLIC BLOOD PRESSURE: 60 MMHG | TEMPERATURE: 98 F | HEIGHT: 61 IN

## 2023-05-16 DIAGNOSIS — M77.9 TENDONITIS: Primary | ICD-10-CM

## 2023-05-16 PROCEDURE — 99213 OFFICE O/P EST LOW 20 MIN: CPT | Mod: PBBFAC | Performed by: FAMILY MEDICINE

## 2023-05-16 PROCEDURE — 99213 PR OFFICE/OUTPT VISIT, EST, LEVL III, 20-29 MIN: ICD-10-PCS | Mod: S$PBB,,, | Performed by: FAMILY MEDICINE

## 2023-05-16 PROCEDURE — 99213 OFFICE O/P EST LOW 20 MIN: CPT | Mod: S$PBB,,, | Performed by: FAMILY MEDICINE

## 2023-05-16 RX ORDER — DICLOFENAC SODIUM 75 MG/1
75 TABLET, DELAYED RELEASE ORAL 2 TIMES DAILY
Qty: 30 TABLET | Refills: 1 | Status: SHIPPED | OUTPATIENT
Start: 2023-05-16

## 2023-05-16 NOTE — PROGRESS NOTES
"Subjective:      Patient ID: Bebe Mosley is a 22 y.o. female.    Vitals:  height is 5' 1" (1.549 m) and weight is 125.5 kg (276 lb 11.2 oz). Her oral temperature is 98 °F (36.7 °C). Her blood pressure is 101/60 and her pulse is 70. Her respiration is 20 and oxygen saturation is 98%.     Chief Complaint: Hand Pain (Right hand tingling, numbness and pain. X3days. 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  No trauma.  No wrist pain.  She does complain of paresthesias along the median distribution.  No weakness.    Hand Pain   Associated symptoms include numbness and tingling. Pertinent negatives include no muscle weakness.     Neurological:  Positive for numbness.    Objective:     Physical Exam   Constitutional: She appears well-developed.  Non-toxic appearance. She does not appear ill. No distress.   Abdominal: Normal appearance.   Musculoskeletal:      Right elbow: Normal.     Right forearm: Normal.      Right hand: Atrophy: There is no right thenar atrophy. Testing: no Tinel's sign at right wrist. Finkelstein's test: negative.        Hands:    Neurological: no focal deficit. She is alert. She displays no weakness.   Skin: Skin is not diaphoretic. Capillary refill takes less than 2 seconds.   Psychiatric: Her behavior is normal. Mood normal.   Nursing note and vitals reviewed.    Assessment:     1. Tendonitis        Plan:   Discussed what is likely tendonitis from recent overuse.  There may be a component of carpal tunnel as well.  Provided a wrist cock-up splint for comfort.  Prescribed a few diclofenac with side effect precautions.  Modify activities as tolerated.  Follow-up with PCP, but return to urgent care if needed.    Tendonitis    Other orders  -     diclofenac (VOLTAREN) 75 MG EC tablet; Take 1 tablet (75 mg total) by mouth 2 (two) times daily.  Dispense: 30 tablet; Refill: 1                    "

## 2023-06-08 ENCOUNTER — HOSPITAL ENCOUNTER (EMERGENCY)
Facility: HOSPITAL | Age: 23
Discharge: HOME | End: 2023-06-09
Attending: EMERGENCY MEDICINE
Payer: MEDICAID

## 2023-06-08 DIAGNOSIS — R10.9 ABDOMINAL PAIN: ICD-10-CM

## 2023-06-08 DIAGNOSIS — R10.12 LEFT UPPER QUADRANT ABDOMINAL PAIN: Primary | ICD-10-CM

## 2023-06-08 LAB
BASOPHILS # BLD AUTO: 0.05 X10(3)/MCL
BASOPHILS NFR BLD AUTO: 0.4 %
EOSINOPHIL # BLD AUTO: 0.19 X10(3)/MCL (ref 0–0.9)
EOSINOPHIL NFR BLD AUTO: 1.6 %
ERYTHROCYTE [DISTWIDTH] IN BLOOD BY AUTOMATED COUNT: 12.9 % (ref 11.5–17)
HCT VFR BLD AUTO: 35 % (ref 37–47)
HGB BLD-MCNC: 12.2 G/DL (ref 12–16)
IMM GRANULOCYTES # BLD AUTO: 0.03 X10(3)/MCL (ref 0–0.04)
IMM GRANULOCYTES NFR BLD AUTO: 0.3 %
LYMPHOCYTES # BLD AUTO: 3.64 X10(3)/MCL (ref 0.6–4.6)
LYMPHOCYTES NFR BLD AUTO: 30.9 %
MCH RBC QN AUTO: 31.9 PG (ref 27–31)
MCHC RBC AUTO-ENTMCNC: 34.9 G/DL (ref 33–36)
MCV RBC AUTO: 91.4 FL (ref 80–94)
MONOCYTES # BLD AUTO: 0.89 X10(3)/MCL (ref 0.1–1.3)
MONOCYTES NFR BLD AUTO: 7.6 %
NEUTROPHILS # BLD AUTO: 6.97 X10(3)/MCL (ref 2.1–9.2)
NEUTROPHILS NFR BLD AUTO: 59.2 %
NRBC BLD AUTO-RTO: 0 %
PLATELET # BLD AUTO: 320 X10(3)/MCL (ref 130–400)
PMV BLD AUTO: 9.6 FL (ref 7.4–10.4)
RBC # BLD AUTO: 3.83 X10(6)/MCL (ref 4.2–5.4)
WBC # SPEC AUTO: 11.77 X10(3)/MCL (ref 4.5–11.5)

## 2023-06-08 PROCEDURE — 81001 URINALYSIS AUTO W/SCOPE: CPT | Performed by: EMERGENCY MEDICINE

## 2023-06-08 PROCEDURE — 80053 COMPREHEN METABOLIC PANEL: CPT | Performed by: EMERGENCY MEDICINE

## 2023-06-08 PROCEDURE — 83690 ASSAY OF LIPASE: CPT | Performed by: EMERGENCY MEDICINE

## 2023-06-08 PROCEDURE — 25000003 PHARM REV CODE 250: Performed by: EMERGENCY MEDICINE

## 2023-06-08 PROCEDURE — 85025 COMPLETE CBC W/AUTO DIFF WBC: CPT | Performed by: EMERGENCY MEDICINE

## 2023-06-08 PROCEDURE — 99284 EMERGENCY DEPT VISIT MOD MDM: CPT

## 2023-06-08 PROCEDURE — 81025 URINE PREGNANCY TEST: CPT | Performed by: EMERGENCY MEDICINE

## 2023-06-08 RX ORDER — SUCRALFATE 1 G/1
1 TABLET ORAL
Status: COMPLETED | OUTPATIENT
Start: 2023-06-08 | End: 2023-06-08

## 2023-06-08 RX ORDER — LIDOCAINE HYDROCHLORIDE 20 MG/ML
10 SOLUTION OROPHARYNGEAL
Status: COMPLETED | OUTPATIENT
Start: 2023-06-08 | End: 2023-06-08

## 2023-06-08 RX ORDER — ALUMINUM HYDROXIDE, MAGNESIUM HYDROXIDE, AND SIMETHICONE 2400; 240; 2400 MG/30ML; MG/30ML; MG/30ML
30 SUSPENSION ORAL
Status: COMPLETED | OUTPATIENT
Start: 2023-06-08 | End: 2023-06-08

## 2023-06-08 RX ADMIN — SUCRALFATE 1 G: 1 TABLET ORAL at 11:06

## 2023-06-08 RX ADMIN — LIDOCAINE HYDROCHLORIDE 10 ML: 20 SOLUTION ORAL at 11:06

## 2023-06-08 RX ADMIN — ALUMINUM HYDROXIDE, MAGNESIUM HYDROXIDE, AND DIMETHICONE 30 ML: 400; 400; 40 SUSPENSION ORAL at 11:06

## 2023-06-08 NOTE — Clinical Note
"Bebe Batresa" Story was seen and treated in our emergency department on 6/8/2023.  She may return to work on 06/10/2023.       If you have any questions or concerns, please don't hesitate to call.       RN    "

## 2023-06-09 VITALS
RESPIRATION RATE: 20 BRPM | DIASTOLIC BLOOD PRESSURE: 60 MMHG | HEIGHT: 60 IN | OXYGEN SATURATION: 100 % | BODY MASS INDEX: 54.19 KG/M2 | WEIGHT: 276 LBS | TEMPERATURE: 98 F | SYSTOLIC BLOOD PRESSURE: 125 MMHG | HEART RATE: 62 BPM

## 2023-06-09 LAB
ALBUMIN SERPL-MCNC: 3.3 G/DL (ref 3.5–5)
ALBUMIN/GLOB SERPL: 0.8 RATIO (ref 1.1–2)
ALP SERPL-CCNC: 66 UNIT/L (ref 40–150)
ALT SERPL-CCNC: 12 UNIT/L (ref 0–55)
APPEARANCE UR: CLEAR
AST SERPL-CCNC: 19 UNIT/L (ref 5–34)
B-HCG SERPL QL: NEGATIVE
BACTERIA #/AREA URNS AUTO: ABNORMAL /HPF
BILIRUB UR QL STRIP.AUTO: NEGATIVE MG/DL
BILIRUBIN DIRECT+TOT PNL SERPL-MCNC: 0.3 MG/DL
BUN SERPL-MCNC: 14.1 MG/DL (ref 7–18.7)
CALCIUM SERPL-MCNC: 8.8 MG/DL (ref 8.4–10.2)
CHLORIDE SERPL-SCNC: 107 MMOL/L (ref 98–107)
CO2 SERPL-SCNC: 23 MMOL/L (ref 22–29)
COLOR UR: ABNORMAL
CREAT SERPL-MCNC: 0.77 MG/DL (ref 0.55–1.02)
GFR SERPLBLD CREATININE-BSD FMLA CKD-EPI: >60 MLS/MIN/1.73/M2
GLOBULIN SER-MCNC: 3.9 GM/DL (ref 2.4–3.5)
GLUCOSE SERPL-MCNC: 101 MG/DL (ref 74–100)
GLUCOSE UR QL STRIP.AUTO: NEGATIVE MG/DL
KETONES UR QL STRIP.AUTO: NEGATIVE MG/DL
LEUKOCYTE ESTERASE UR QL STRIP.AUTO: NEGATIVE UNIT/L
LIPASE SERPL-CCNC: 17 U/L
NITRITE UR QL STRIP.AUTO: NEGATIVE
PH UR STRIP.AUTO: 6 [PH]
POTASSIUM SERPL-SCNC: 3.8 MMOL/L (ref 3.5–5.1)
PROT SERPL-MCNC: 7.2 GM/DL (ref 6.4–8.3)
PROT UR QL STRIP.AUTO: NEGATIVE MG/DL
RBC #/AREA URNS AUTO: ABNORMAL /HPF
RBC UR QL AUTO: ABNORMAL UNIT/L
SODIUM SERPL-SCNC: 140 MMOL/L (ref 136–145)
SP GR UR STRIP.AUTO: 1.02
SQUAMOUS #/AREA URNS AUTO: ABNORMAL /HPF
UROBILINOGEN UR STRIP-ACNC: 0.2 MG/DL
WBC #/AREA URNS AUTO: ABNORMAL /HPF

## 2023-06-09 RX ORDER — SUCRALFATE 1 G/10ML
1 SUSPENSION ORAL 4 TIMES DAILY
Qty: 400 ML | Refills: 0 | Status: SHIPPED | OUTPATIENT
Start: 2023-06-09 | End: 2023-06-19

## 2023-06-09 RX ORDER — PANTOPRAZOLE SODIUM 40 MG/1
40 TABLET, DELAYED RELEASE ORAL DAILY
Qty: 30 TABLET | Refills: 2 | Status: SHIPPED | OUTPATIENT
Start: 2023-06-09 | End: 2023-09-07

## 2023-06-09 RX ORDER — DICYCLOMINE HYDROCHLORIDE 20 MG/1
20 TABLET ORAL 4 TIMES DAILY
Qty: 40 TABLET | Refills: 0 | Status: SHIPPED | OUTPATIENT
Start: 2023-06-09 | End: 2023-06-19

## 2023-06-09 NOTE — ED PROVIDER NOTES
"Encounter Date: 2023       History     Chief Complaint   Patient presents with    Abdominal Pain     " My stomach hurts for 3 days and I feel dizzy."     The history is provided by the patient. No  was used.   Abdominal Pain  The current episode started several days ago. The onset of the illness was gradual. The abdominal pain is located in the LUQ. The abdominal pain does not radiate. The abdominal pain is relieved by nothing. The abdominal pain is exacerbated by eating. The other symptoms of the illness do not include fever, shortness of breath, nausea, vomiting, diarrhea or dysuria.   The patient states that she believes she is currently not pregnant. The patient has not had a change in bowel habit. Symptoms associated with the illness do not include back pain.   Review of patient's allergies indicates:   Allergen Reactions    Pecan nut Anaphylaxis    Fish containing products Rash     No past medical history on file.  Past Surgical History:   Procedure Laterality Date     SECTION      TONSILLECTOMY       Family History   Problem Relation Age of Onset    Hypertension Father     Diabetes Father      Social History     Tobacco Use    Smoking status: Never    Smokeless tobacco: Never   Substance Use Topics    Alcohol use: Yes     Comment: occassionally    Drug use: Never     Review of Systems   Constitutional:  Negative for fever.   HENT:  Negative for sore throat.    Respiratory:  Negative for shortness of breath.    Cardiovascular:  Negative for chest pain.   Gastrointestinal:  Positive for abdominal pain. Negative for diarrhea, nausea and vomiting.   Genitourinary:  Negative for dysuria.   Musculoskeletal:  Negative for back pain.   Skin:  Negative for rash.   Neurological:  Negative for weakness.   Hematological:  Does not bruise/bleed easily.     Physical Exam     Initial Vitals [23 2316]   BP Pulse Resp Temp SpO2   125/60 62 20 98.2 °F (36.8 °C) 100 %      MAP       --     "     Physical Exam    Nursing note and vitals reviewed.  Constitutional: She appears well-developed and well-nourished.   HENT:   Head: Normocephalic and atraumatic.   Right Ear: External ear normal.   Left Ear: External ear normal.   Eyes: Conjunctivae and EOM are normal. Pupils are equal, round, and reactive to light.   Neck: Neck supple.   Normal range of motion.  Cardiovascular:  Normal rate, regular rhythm, normal heart sounds and intact distal pulses.           Pulmonary/Chest: Breath sounds normal.   Abdominal: Abdomen is soft. Bowel sounds are normal. There is abdominal tenderness in the left upper quadrant.   Morbidly obese     There is no rebound and no guarding.   Musculoskeletal:         General: Normal range of motion.      Cervical back: Normal range of motion and neck supple.     Neurological: She is alert and oriented to person, place, and time. GCS score is 15. GCS eye subscore is 4. GCS verbal subscore is 5. GCS motor subscore is 6.   Skin: Skin is warm and dry. Capillary refill takes less than 2 seconds.   Psychiatric: She has a normal mood and affect. Her behavior is normal. Judgment and thought content normal.       ED Course   Procedures  Labs Reviewed   COMPREHENSIVE METABOLIC PANEL - Abnormal; Notable for the following components:       Result Value    Glucose Level 101 (*)     Albumin Level 3.3 (*)     Globulin 3.9 (*)     Albumin/Globulin Ratio 0.8 (*)     All other components within normal limits   URINALYSIS, REFLEX TO URINE CULTURE - Abnormal; Notable for the following components:    Blood, UA Trace (*)     All other components within normal limits   CBC WITH DIFFERENTIAL - Abnormal; Notable for the following components:    WBC 11.77 (*)     RBC 3.83 (*)     Hct 35.0 (*)     MCH 31.9 (*)     All other components within normal limits   URINALYSIS, MICROSCOPIC - Abnormal; Notable for the following components:    Squamous Epithelial Cells, UA Few (*)     All other components within normal  limits   LIPASE - Normal   PREGNANCY TEST, URINE RAPID - Normal   CBC W/ AUTO DIFFERENTIAL    Narrative:     The following orders were created for panel order CBC auto differential.  Procedure                               Abnormality         Status                     ---------                               -----------         ------                     CBC with Differential[[id number]]        Abnormal            Final result                 Please view results for these tests on the individual orders.          Imaging Results              X-Ray Abdomen Flat And Erect (Preliminary result)  Result time 06/09/23 00:26:41      Wet Read by Camilo Clayton MD (06/09/23 00:26:41, [facility] - Emergency Dept, Emergency Medicine)    NAF                                     Medications   LIDOcaine HCl 2% oral solution 10 mL (10 mLs Oral Given 6/8/23 2333)   aluminum & magnesium hydroxide-simethicone 400-400-40 mg/5 mL suspension 30 mL (30 mLs Oral Given 6/8/23 2333)   sucralfate tablet 1 g (1 g Oral Given 6/8/23 2333)         Differential includes: gastritis, PUD, pancreatitis, UTI, IBS, IBD, ectopic pregnancy.   Will obtain CBC, CMP, lipase, UA, UPT and flat and erect abdominal x-rays                     Clinical Impression:   Final diagnoses:  [R10.9] Abdominal pain  [R10.12] Left upper quadrant abdominal pain (Primary)        ED Disposition Condition    Discharge Stable          ED Prescriptions       Medication Sig Dispense Start Date End Date Auth. Provider    dicyclomine (BENTYL) 20 mg tablet Take 1 tablet (20 mg total) by mouth 4 (four) times daily. for 10 days 40 tablet 6/9/2023 6/19/2023 Camilo Clayton MD    pantoprazole (PROTONIX) 40 MG tablet Take 1 tablet (40 mg total) by mouth once daily. 30 tablet 6/9/2023 9/7/2023 Camilo Clayton MD    sucralfate (CARAFATE) 100 mg/mL suspension Take 10 mLs (1 g total) by mouth 4 (four) times daily. for 10 days 400 mL 6/9/2023  6/19/2023 Camilo Clayton MD          Follow-up Information       Follow up With Specialties Details Why Contact Info    Call [phone] to establish primary care with [facility]                 Camilo Clayton MD  06/09/23 0023

## 2023-06-09 NOTE — ED TRIAGE NOTES
Cramping type abdominal pain for 3 days took unknown medicine given by sister with no relief. Last BM today

## 2023-06-20 ENCOUNTER — PATIENT OUTREACH (OUTPATIENT)
Dept: EMERGENCY MEDICINE | Facility: HOSPITAL | Age: 23
End: 2023-06-20

## 2023-06-20 ENCOUNTER — PATIENT MESSAGE (OUTPATIENT)
Dept: RESEARCH | Facility: HOSPITAL | Age: 23
End: 2023-06-20

## 2023-06-21 ENCOUNTER — PATIENT OUTREACH (OUTPATIENT)
Dept: EMERGENCY MEDICINE | Facility: HOSPITAL | Age: 23
End: 2023-06-21

## 2023-06-22 ENCOUNTER — OFFICE VISIT (OUTPATIENT)
Dept: GYNECOLOGY | Facility: CLINIC | Age: 23
End: 2023-06-22
Payer: MEDICAID

## 2023-06-22 ENCOUNTER — PATIENT OUTREACH (OUTPATIENT)
Dept: EMERGENCY MEDICINE | Facility: HOSPITAL | Age: 23
End: 2023-06-22

## 2023-06-22 VITALS
HEIGHT: 60 IN | OXYGEN SATURATION: 100 % | WEIGHT: 269.38 LBS | DIASTOLIC BLOOD PRESSURE: 57 MMHG | HEART RATE: 77 BPM | SYSTOLIC BLOOD PRESSURE: 84 MMHG | TEMPERATURE: 99 F | BODY MASS INDEX: 52.89 KG/M2 | RESPIRATION RATE: 20 BRPM

## 2023-06-22 DIAGNOSIS — N89.8 VAGINAL ODOR: ICD-10-CM

## 2023-06-22 DIAGNOSIS — Z30.431 INTRAUTERINE DEVICE SURVEILLANCE: Primary | ICD-10-CM

## 2023-06-22 LAB
C TRACH DNA SPEC QL NAA+PROBE: NOT DETECTED
CLUE CELLS VAG QL WET PREP: ABNORMAL
N GONORRHOEA DNA SPEC QL NAA+PROBE: NOT DETECTED
T VAGINALIS VAG QL WET PREP: ABNORMAL
WBC #/AREA VAG WET PREP: ABNORMAL
YEAST SPEC QL WET PREP: ABNORMAL

## 2023-06-22 PROCEDURE — 3074F PR MOST RECENT SYSTOLIC BLOOD PRESSURE < 130 MM HG: ICD-10-PCS | Mod: CPTII,,, | Performed by: NURSE PRACTITIONER

## 2023-06-22 PROCEDURE — 99213 OFFICE O/P EST LOW 20 MIN: CPT | Mod: PBBFAC | Performed by: NURSE PRACTITIONER

## 2023-06-22 PROCEDURE — 1159F MED LIST DOCD IN RCRD: CPT | Mod: CPTII,,, | Performed by: NURSE PRACTITIONER

## 2023-06-22 PROCEDURE — 3078F DIAST BP <80 MM HG: CPT | Mod: CPTII,,, | Performed by: NURSE PRACTITIONER

## 2023-06-22 PROCEDURE — 99213 OFFICE O/P EST LOW 20 MIN: CPT | Mod: S$PBB,,, | Performed by: NURSE PRACTITIONER

## 2023-06-22 PROCEDURE — 3008F BODY MASS INDEX DOCD: CPT | Mod: CPTII,,, | Performed by: NURSE PRACTITIONER

## 2023-06-22 PROCEDURE — 3008F PR BODY MASS INDEX (BMI) DOCUMENTED: ICD-10-PCS | Mod: CPTII,,, | Performed by: NURSE PRACTITIONER

## 2023-06-22 PROCEDURE — 1159F PR MEDICATION LIST DOCUMENTED IN MEDICAL RECORD: ICD-10-PCS | Mod: CPTII,,, | Performed by: NURSE PRACTITIONER

## 2023-06-22 PROCEDURE — 87591 N.GONORRHOEAE DNA AMP PROB: CPT | Performed by: NURSE PRACTITIONER

## 2023-06-22 PROCEDURE — 3078F PR MOST RECENT DIASTOLIC BLOOD PRESSURE < 80 MM HG: ICD-10-PCS | Mod: CPTII,,, | Performed by: NURSE PRACTITIONER

## 2023-06-22 PROCEDURE — 99213 PR OFFICE/OUTPT VISIT, EST, LEVL III, 20-29 MIN: ICD-10-PCS | Mod: S$PBB,,, | Performed by: NURSE PRACTITIONER

## 2023-06-22 PROCEDURE — 87210 SMEAR WET MOUNT SALINE/INK: CPT | Performed by: NURSE PRACTITIONER

## 2023-06-22 PROCEDURE — 3074F SYST BP LT 130 MM HG: CPT | Mod: CPTII,,, | Performed by: NURSE PRACTITIONER

## 2023-06-22 NOTE — PROGRESS NOTES
Closed/resolved encounter. Attempted to contact patient on 3 separate occasions. Close - Unable to reach

## 2023-06-22 NOTE — PROGRESS NOTES
Subjective:       Patient ID: Bebe Mosley is a 23 y.o. female.    Chief Complaint:  6 week string check      History of Present Illness  Pt presents today for IUD surveillance. Sklya IUD was placed on 5/10/23 for contraception. First cycle was on 23 and normal per pt. No c/o pain with intercourse. Pt does c/o vaginal odor/irritation since using Arm and Hammer washing powder.    GYN & OB History  Patient's last menstrual period was 2023 (exact date).   Date of Last Pap: 2020    Review of patient's allergies indicates:   Allergen Reactions    Pecan nut Anaphylaxis    Fish containing products Rash     History reviewed. No pertinent past medical history.  OB History    Para Term  AB Living   1 1           SAB IAB Ectopic Multiple Live Births                  # Outcome Date GA Lbr Cody/2nd Weight Sex Delivery Anes PTL Lv   1 Para                 Review of Systems  Review of Systems    Negative except for pertinent findings for positives per HPI     Objective:    Physical Exam    BP (!) 84/57 (BP Location: Left arm, Patient Position: Sitting, BP Method: Medium (Automatic))   Pulse 77   Temp 98.6 °F (37 °C) (Oral)   Resp 20   Ht 5' (1.524 m)   Wt 122.2 kg (269 lb 6.4 oz)   LMP 2023 (Exact Date)   SpO2 100%   BMI 52.61 kg/m²   GENERAL: Well-developed female in no acute distress.  SKIN: Normal to inspection, warm and intact.  VULVA: General appearance normal; external genitalia with no lesions or erythema.  VAGINA: Mucosa normal, pink, no abnormal discharge or lesions.  CERVIX: Grossly normal, pink, no erythema or abnormal discharge. IUD strings visible from os  PSYCHIATRIC: Patient is oriented to person, place, and time. Mood and affect are normal.    Assessment:       1. Intrauterine device surveillance    2. Vaginal odor       Plan:   Bebe was seen today for 6 week string check.    Diagnoses and all orders for this visit:    Intrauterine device surveillance    Vaginal  odor  -     Chlamydia/GC, PCR  -     Wet Prep, Genital    IUD strings visible from os, no complaints  Wet prep and g/c for vaginal odor  Follow up for annual exam.

## 2023-06-27 ENCOUNTER — PATIENT MESSAGE (OUTPATIENT)
Dept: RESEARCH | Facility: HOSPITAL | Age: 23
End: 2023-06-27

## 2023-07-19 ENCOUNTER — PATIENT MESSAGE (OUTPATIENT)
Dept: RESEARCH | Facility: HOSPITAL | Age: 23
End: 2023-07-19

## 2023-07-24 ENCOUNTER — PATIENT MESSAGE (OUTPATIENT)
Dept: RESEARCH | Facility: HOSPITAL | Age: 23
End: 2023-07-24

## 2023-08-25 ENCOUNTER — HOSPITAL ENCOUNTER (EMERGENCY)
Facility: HOSPITAL | Age: 23
Discharge: HOME | End: 2023-08-25
Attending: INTERNAL MEDICINE
Payer: MEDICAID

## 2023-08-25 VITALS
SYSTOLIC BLOOD PRESSURE: 120 MMHG | HEART RATE: 68 BPM | RESPIRATION RATE: 18 BRPM | TEMPERATURE: 98 F | OXYGEN SATURATION: 98 % | DIASTOLIC BLOOD PRESSURE: 68 MMHG

## 2023-08-25 DIAGNOSIS — L02.411 ABSCESS OF RIGHT AXILLA: Primary | ICD-10-CM

## 2023-08-25 PROCEDURE — 10060 I&D ABSCESS SIMPLE/SINGLE: CPT

## 2023-08-25 PROCEDURE — 99284 EMERGENCY DEPT VISIT MOD MDM: CPT

## 2023-08-25 PROCEDURE — 25000003 PHARM REV CODE 250: Performed by: INTERNAL MEDICINE

## 2023-08-25 RX ORDER — HYDROCODONE BITARTRATE AND ACETAMINOPHEN 7.5; 325 MG/1; MG/1
1 TABLET ORAL EVERY 6 HOURS PRN
Qty: 12 TABLET | Refills: 0 | Status: SHIPPED | OUTPATIENT
Start: 2023-08-25 | End: 2023-11-15

## 2023-08-25 RX ORDER — SULFAMETHOXAZOLE AND TRIMETHOPRIM 800; 160 MG/1; MG/1
1 TABLET ORAL 2 TIMES DAILY
Qty: 20 TABLET | Refills: 0 | Status: SHIPPED | OUTPATIENT
Start: 2023-08-25 | End: 2023-09-04

## 2023-08-25 RX ORDER — LIDOCAINE HYDROCHLORIDE 10 MG/ML
20 INJECTION INFILTRATION; PERINEURAL ONCE
Status: COMPLETED | OUTPATIENT
Start: 2023-08-25 | End: 2023-08-25

## 2023-08-25 RX ADMIN — LIDOCAINE HYDROCHLORIDE 20 ML: 10 INJECTION, SOLUTION INFILTRATION; PERINEURAL at 06:08

## 2023-08-25 NOTE — ED PROVIDER NOTES
Source of History:  Patient, no limitations    Chief complaint:  Abscess (Right axillary abscess x 4 days. )      HPI:  Bebe Mosley is a 23 y.o. female presenting with Abscess (Right axillary abscess x 4 days. )         Abscess: Patient presents for evaluation of a cutaneous abscess. Lesion is located in the right axilla. Onset was 4 days ago. Symptoms have  rapidly worse . Abscess has associated symptoms of pain. Patient does have previous history of cutaneous abscesses. Patient does not have diabetes.        Review of Systems   Constitutional symptoms:  Negative except as documented in HPI.   Skin symptoms:  Negative except as documented in HPI.   HEENT symptoms:  Negative except as documented in HPI.   Respiratory symptoms:  Negative except as documented in HPI.   Cardiovascular symptoms:  Negative except as documented in HPI.   Gastrointestinal symptoms:  Negative except as documented in HPI.    Genitourinary symptoms:  Negative except as documented in HPI.   Musculoskeletal symptoms:  Negative except as documented in HPI.   Neurologic symptoms:  Negative except as documented in HPI.   Psychiatric symptoms:  Negative except as documented in HPI.   Allergy/immunologic symptoms:  Negative except as documented in HPI.             Additional review of systems information: All other systems reviewed and otherwise negative.      Review of patient's allergies indicates:   Allergen Reactions    Pecan nut Anaphylaxis    Fish containing products Rash       PMH:  As per HPI and below:    No past medical history on file.     Family History   Problem Relation Age of Onset    Hypertension Father     Diabetes Father        Past Surgical History:   Procedure Laterality Date     SECTION      TONSILLECTOMY         Social History     Tobacco Use    Smoking status: Never    Smokeless tobacco: Never   Substance Use Topics    Alcohol use: Yes     Comment: occassionally    Drug use: Never       There is no  problem list on file for this patient.       Physical Exam:    /68   Pulse 68   Temp 98.2 °F (36.8 °C) (Oral)   Resp 18   SpO2 98%     Nursing note and vital signs reviewed.    General:  Alert, no acute distress.   Skin: Normal for Ethnic Origin, No cyanosis, right axillary abscess, scar nearby from previous I&D  HEENT: Normocephalic and atraumatic, Vision unchanged, Pupils symmetric, No icterus , Nasal mucosa is pink and moist  Cardiovascular:  Regular rate and rhythm, No edema  Chest Wall: No deformity, equal chest rise  Respiratory:  Lungs are clear to auscultation, respirations are non-labored.    Musculoskeletal:  No deformity, Normal perfusion to all extremities  Gastrointestinal:  Soft, Non distended  Neurological:  Alert and oriented, normal motor observed, normal speech observed.    Psychiatric:  Cooperative, appropriate mood & affect.        Labs that have been ordered have been independently reviewed and interpreted by myself.     Old Chart Reviewed.      Initial Impression/ Differential Dx:  Cellulitis, abscess, necrotizing fasciitis, MRSA, drug eruption, allergic/contact dermatitis, local trauma/contusion      MDM:      Reviewed Nurses Note.    Reviewed Pertinent old records.    Orders Placed This Encounter    INCISION AND DRAINAGE    Ambulatory referral/consult to General Surgery    LIDOcaine HCL 10 mg/ml (1%) injection 20 mL    sulfamethoxazole-trimethoprim 800-160mg (BACTRIM DS) 800-160 mg Tab    HYDROcodone-acetaminophen (NORCO) 7.5-325 mg per tablet                    Labs Reviewed - No data to display       No orders to display        No visits with results within 1 Day(s) from this visit.   Latest known visit with results is:   Office Visit on 06/22/2023   Component Date Value Ref Range Status    Chlamydia trachomatis PCR 06/22/2023 Not Detected  Not Detected Final    N. gonorrhea PCR 06/22/2023 Not Detected  Not Detected Final    WBC, Wet Prep 06/22/2023 Few (A)  None Seen Final     Clue Cells, Wet Prep 06/22/2023 None Seen  None Seen Final    Trichomonas, Wet Prep 06/22/2023 None Seen  None Seen Final    Yeast, Wet Prep 06/22/2023 None Seen  None Seen Final       Imaging Results    None                                        I & D - Incision and Drainage    Date/Time: 8/25/2023 6:50 PM  Location procedure was performed: [facility] EMERGENCY DEPARTMENT    Performed by: Cristóbal Kaplan DO  Authorized by: Cristóbal Kaplan DO  Consent Done: Yes  Consent: Verbal consent obtained.  Risks and benefits: risks, benefits and alternatives were discussed  Consent given by: patient  Patient identity confirmed: verbally with patient  Type: abscess  Body area: upper extremity (Right axilla)  Anesthesia: local infiltration    Anesthesia:  Local Anesthetic: lidocaine 1% without epinephrine  Anesthetic total: 10 mL    Patient sedated: no  Scalpel size: 11  Incision type: single straight  Incision depth: dermal  Drainage: purulent  Drainage amount: moderate  Wound treatment: incision and wound packed  Packing material: 1/4 in iodoform gauze  Patient tolerance: Patient tolerated the procedure well with no immediate complications    Incision depth: dermal              Diagnostic Impression:    1. Abscess of right axilla         ED Disposition Condition    Discharge Stable             Follow-up Information       [facility] - Emergency Dept.    Specialty: Emergency Medicine  Why: If symptoms worsen  Contact information:  [address] [facility] [facility] [address] [phone]             Call  [facility] - General Surgery Services.    Specialty: General Surgery  Contact information:  [address] [facility] [address] [phone]                            ED Prescriptions       Medication Sig Dispense Start Date End Date Auth. Provider    sulfamethoxazole-trimethoprim 800-160mg (BACTRIM DS) 800-160 mg Tab Take 1 tablet by mouth 2 (two) times  daily. for 10 days 20 tablet 8/25/2023 9/4/2023 Cristóbal Kaplan,     HYDROcodone-acetaminophen (NORCO) 7.5-325 mg per tablet Take 1 tablet by mouth every 6 (six) hours as needed for Pain. 12 tablet 8/25/2023 -- Cristóbal Kaplan DO          Follow-up Information       Follow up With Specialties Details Why Contact Info    [facility] - Emergency Dept Emergency Medicine  If symptoms worsen [address]  [facility] [id number] [phone]    [facility] General Surgery Call   [address] [facility] [address] [phone]             Cristóbal Kaplan DO  08/26/23 0788

## 2023-10-06 ENCOUNTER — HOSPITAL ENCOUNTER (EMERGENCY)
Facility: HOSPITAL | Age: 23
Discharge: LEFT BEFORE DISCHARGE | End: 2023-10-06

## 2023-10-06 VITALS
SYSTOLIC BLOOD PRESSURE: 146 MMHG | DIASTOLIC BLOOD PRESSURE: 63 MMHG | TEMPERATURE: 98 F | WEIGHT: 265 LBS | OXYGEN SATURATION: 98 % | HEIGHT: 61 IN | RESPIRATION RATE: 20 BRPM | HEART RATE: 82 BPM | BODY MASS INDEX: 50.03 KG/M2

## 2023-10-06 LAB
APPEARANCE UR: CLEAR
B-HCG SERPL QL: NEGATIVE
BILIRUB UR QL STRIP.AUTO: NEGATIVE
C TRACH DNA SPEC QL NAA+PROBE: NOT DETECTED
COLOR UR AUTO: YELLOW
GLUCOSE UR QL STRIP.AUTO: NEGATIVE
KETONES UR QL STRIP.AUTO: NEGATIVE
LEUKOCYTE ESTERASE UR QL STRIP.AUTO: NEGATIVE
N GONORRHOEA DNA SPEC QL NAA+PROBE: NOT DETECTED
NITRITE UR QL STRIP.AUTO: NEGATIVE
PH UR STRIP.AUTO: 6 [PH]
PROT UR QL STRIP.AUTO: NEGATIVE
RBC UR QL AUTO: NEGATIVE
SOURCE (OHS): NORMAL
SP GR UR STRIP.AUTO: >=1.03 (ref 1–1.03)
UROBILINOGEN UR STRIP-ACNC: 0.2

## 2023-10-06 PROCEDURE — 81003 URINALYSIS AUTO W/O SCOPE: CPT

## 2023-10-06 PROCEDURE — 81025 URINE PREGNANCY TEST: CPT

## 2023-10-06 PROCEDURE — 99283 EMERGENCY DEPT VISIT LOW MDM: CPT

## 2023-10-06 PROCEDURE — 87491 CHLMYD TRACH DNA AMP PROBE: CPT

## 2023-10-06 NOTE — ED TRIAGE NOTES
Pt reports pelvic cramping today, she has an IUD (placed in May 2023), denies bleeding or vaginal d/c, LMP 9/23/23. Pt has h/o chlamydia & would like to be be tested again for it.

## 2023-10-10 ENCOUNTER — OFFICE VISIT (OUTPATIENT)
Dept: SURGERY | Facility: CLINIC | Age: 23
End: 2023-10-10

## 2023-10-10 VITALS
HEART RATE: 54 BPM | WEIGHT: 264 LBS | RESPIRATION RATE: 18 BRPM | DIASTOLIC BLOOD PRESSURE: 75 MMHG | TEMPERATURE: 98 F | OXYGEN SATURATION: 99 % | HEIGHT: 61 IN | BODY MASS INDEX: 49.84 KG/M2 | SYSTOLIC BLOOD PRESSURE: 132 MMHG

## 2023-10-10 DIAGNOSIS — Z87.2 HISTORY OF ABSCESS OF SKIN AND SUBCUTANEOUS TISSUE: Primary | ICD-10-CM

## 2023-10-10 PROCEDURE — 99214 OFFICE O/P EST MOD 30 MIN: CPT | Mod: PBBFAC

## 2023-10-10 NOTE — PROGRESS NOTES
\[facility]\"" GENERAL SURGERY   Clinic Note     HPI:   Bebe Mosley is a 23 y.o. with no signifcant PMHx here for assessment due to a history of recurrent R. Axillary abscesses. Patient states that she has had 4 episodes of axillary abscess that have required drainage and antibiotic administration since the age of 16. Patient's most recent abscess drainage occurred 23. Patient denies shaving since this event and has not had an abscess form since this date. She reports all the previous episodes where she developed abscesses was after shaving her axilla.    Review of Systems:  10 point review of systems denied unless otherwise indicated above HPI    Allergies:   Review of patient's allergies indicates:   Allergen Reactions    Pecan nut Anaphylaxis    Fish containing products Rash       Meds:     Current Outpatient Medications:     diclofenac (VOLTAREN) 75 MG EC tablet, Take 1 tablet (75 mg total) by mouth 2 (two) times daily., Disp: 30 tablet, Rfl: 1    HYDROcodone-acetaminophen (NORCO) 7.5-325 mg per tablet, Take 1 tablet by mouth every 6 (six) hours as needed for Pain., Disp: 12 tablet, Rfl: 0    phentermine (ADIPEX-P) 37.5 mg tablet, Take 37.5 mg by mouth., Disp: , Rfl:     pantoprazole (PROTONIX) 40 MG tablet, Take 1 tablet (40 mg total) by mouth once daily., Disp: 30 tablet, Rfl: 2    PMH:   History reviewed. No pertinent past medical history.     Social History:   Social History     Tobacco Use    Smoking status: Never    Smokeless tobacco: Never   Substance Use Topics    Alcohol use: Yes     Comment: occassionally    Drug use: Never       Family History:   Family History   Problem Relation Age of Onset    Hypertension Father     Diabetes Father        Surgical History:   Past Surgical History:   Procedure Laterality Date     SECTION      TONSILLECTOMY         Objective:  Vitals:  Vitals:    10/10/23 1312   BP: 132/75   Pulse: (!) 54   Resp: 18   Temp: 97.9 °F (36.6 °C)        Physical Exam:  Gen:  NAD  Neuro: awake, alert, answering questions appropriately  CV: RRR  Resp: non-labored breathing  Abd: soft, ND, NT  Ext: moves all 4 spontaneously and purposefully  Skin: warm, well perfused. Well healed scar from drainage of abscess in R. Axilla, no pitting or sinuses, no palpable sebaceous cysts    Pertinent Labs:  - none    Imaging:  - none    Micro/Path/Other:  - none    Assessment/Plan:  Bebe Mosley is a 23 y.o. with no significant PMH here for assessment due to a history of recurrent R. Axillary abscesses.     - Counseled patient to keep axilla clean and dry and avoid shaving, recommend alternative methods of hair removal if needed such as Elder or laser hair removal  - RTC PRN    Charles Hernandez MS3  10/10/2023 1:37 PM        Agree with above, changes made as needed.  Judit Snyder MD  [employer] General Surgery, PGY5

## 2023-10-11 NOTE — PROGRESS NOTES
I have reviewed the notes, assessments, and/or procedures performed by the resident, I concur with her/his documentation of Bebe Mosley.     Padmini Navarro MD

## 2023-11-15 ENCOUNTER — OFFICE VISIT (OUTPATIENT)
Dept: GYNECOLOGY | Facility: CLINIC | Age: 23
End: 2023-11-15

## 2023-11-15 VITALS
RESPIRATION RATE: 20 BRPM | OXYGEN SATURATION: 100 % | TEMPERATURE: 98 F | HEIGHT: 61 IN | WEIGHT: 263 LBS | DIASTOLIC BLOOD PRESSURE: 68 MMHG | BODY MASS INDEX: 49.65 KG/M2 | SYSTOLIC BLOOD PRESSURE: 104 MMHG | HEART RATE: 64 BPM

## 2023-11-15 DIAGNOSIS — Z12.4 PAP SMEAR FOR CERVICAL CANCER SCREENING: Primary | ICD-10-CM

## 2023-11-15 DIAGNOSIS — Z30.431 INTRAUTERINE DEVICE SURVEILLANCE: ICD-10-CM

## 2023-11-15 DIAGNOSIS — Z11.3 ROUTINE SCREENING FOR STI (SEXUALLY TRANSMITTED INFECTION): ICD-10-CM

## 2023-11-15 LAB
C TRACH DNA SPEC QL NAA+PROBE: NOT DETECTED
CLUE CELLS VAG QL WET PREP: ABNORMAL
N GONORRHOEA DNA SPEC QL NAA+PROBE: NOT DETECTED
SOURCE (OHS): NORMAL
T VAGINALIS VAG QL WET PREP: ABNORMAL
WBC #/AREA VAG WET PREP: ABNORMAL
YEAST SPEC QL WET PREP: ABNORMAL

## 2023-11-15 PROCEDURE — 99395 PR PREVENTIVE VISIT,EST,18-39: ICD-10-PCS | Mod: S$PBB,,, | Performed by: NURSE PRACTITIONER

## 2023-11-15 PROCEDURE — 87491 CHLMYD TRACH DNA AMP PROBE: CPT

## 2023-11-15 PROCEDURE — 87624 HPV HI-RISK TYP POOLED RSLT: CPT

## 2023-11-15 PROCEDURE — 99395 PREV VISIT EST AGE 18-39: CPT | Mod: S$PBB,,, | Performed by: NURSE PRACTITIONER

## 2023-11-15 PROCEDURE — 88174 CYTOPATH C/V AUTO IN FLUID: CPT | Performed by: NURSE PRACTITIONER

## 2023-11-15 PROCEDURE — 99213 OFFICE O/P EST LOW 20 MIN: CPT | Mod: PBBFAC | Performed by: NURSE PRACTITIONER

## 2023-11-15 PROCEDURE — 87210 SMEAR WET MOUNT SALINE/INK: CPT | Performed by: NURSE PRACTITIONER

## 2023-11-15 PROCEDURE — 87591 N.GONORRHOEAE DNA AMP PROB: CPT

## 2023-11-15 PROCEDURE — 87591 N.GONORRHOEAE DNA AMP PROB: CPT | Performed by: NURSE PRACTITIONER

## 2023-11-15 NOTE — LETTER
November 15, 2023      [facility]  [address] [facility] [address]  Phone: [phone]       Patient: Bebe Mosley   YOB: 2000  Date of Visit: 11/15/2023    To Whom It May Concern:    Jones Mosley  was at [facility] on 11/15/2023. The patient may return to work/school on 11/15/2023 with no restrictions. If you have any questions or concerns, or if I can be of further assistance, please do not hesitate to contact me.    Sincerely,    Dima Baker NP

## 2023-11-15 NOTE — PROGRESS NOTES
"  Subjective:       Patient ID: Bebe Mosley is a 23 y.o. female.    Chief Complaint:  Gynecologic Exam      History of Present Illness  The patient  here for annual exam. Pt had Leila IUD placed on 5/10/23. She has had monthly cycles which she feels were heavy, changing pads 2-3x/day. Her LMP was 10/23/23. Period last 5 days with just spotting. Used Depo in past. Denies history of abnormal paps. Last pap -NIL. Denies breast or urinary complaints. Denies pelvic pain or discharge. Treated for chlamydia last year and desires testing. Declines HPV vaccine. Denies tobacco use. Dep. screening 0. Denies fly hx of breast, ovarian, uterine or colon cancer.      GYN & OB History  Patient's last menstrual period was 10/23/2023 (exact date).   Date of Last Pap: 2020    Review of patient's allergies indicates:   Allergen Reactions    Pecan nut Anaphylaxis    Fish containing products Rash     History reviewed. No pertinent past medical history.  OB History    Para Term  AB Living   1 1           SAB IAB Ectopic Multiple Live Births                  # Outcome Date GA Lbr Cody/2nd Weight Sex Delivery Anes PTL Lv   1 Para                 Review of Systems  Review of Systems    Negative except for pertinent findings for positives per HPI     Objective:    Physical Exam    /68 (BP Location: Right arm, Patient Position: Sitting, BP Method: Medium (Automatic))   Pulse 64   Temp 98.2 °F (36.8 °C) (Oral)   Resp 20   Ht 5' 1" (1.549 m)   Wt 119.3 kg (263 lb)   LMP 10/23/2023 (Exact Date)   SpO2 100%   BMI 49.69 kg/m²   GENERAL: Well-developed female. No acute distress.    SKIN: Normal to inspection, warm and intact.  BREASTS: No rashes or erythema. No masses, lumps, discharge, tenderness.  ABDOMEN: Soft, non tender.  VULVA: General appearance normal; external genitalia with no lesions or erythema.  BLADDER: No tenderness.  VAGINA: Mucosa/vaginal vault pink, no abnormal discharge or " lesions.  CERVIX: Pink, nulliparous appearing os, IUD strings visible from os, no erythema or abnormal discharge.  BIMANUAL EXAM: reveals a 12 week-sized uterus. The uterus is non tender. Maxime adnexa reveal no tenderness.  PSYCHIATRIC: Patient is oriented to person, place, and time. Mood and affect are normal.      Assessment:       1. Pap smear for cervical cancer screening    2. Routine screening for STI (sexually transmitted infection)    3. Intrauterine device surveillance       Plan:   Bebe was seen today for gynecologic exam.    Diagnoses and all orders for this visit:    Pap smear for cervical cancer screening  -     Liquid-Based Pap Smear, Screening Screening    Routine screening for STI (sexually transmitted infection)  -     Chlamydia/GC, PCR  -     Wet Prep, Genital  -     HIV 1/2 Ag/Ab (4th Gen); Future  -     Hepatitis C Antibody; Future  -     Hepatitis B Surface Antigen; Future  -     SYPHILIS ANTIBODY (WITH REFLEX RPR); Future    Intrauterine device surveillance     Pap today  STI screen, safe sex practices.  IUD string visible from os. Due out 5/2026  Follow up in about 1 year (around 11/15/2024) for annual exam.

## 2023-11-17 LAB — PSYCHE PATHOLOGY RESULT: NORMAL

## 2024-04-04 ENCOUNTER — HOSPITAL ENCOUNTER (EMERGENCY)
Facility: HOSPITAL | Age: 24
Discharge: HOME | End: 2024-04-04
Attending: STUDENT IN AN ORGANIZED HEALTH CARE EDUCATION/TRAINING PROGRAM

## 2024-04-04 VITALS
BODY MASS INDEX: 48.52 KG/M2 | DIASTOLIC BLOOD PRESSURE: 87 MMHG | SYSTOLIC BLOOD PRESSURE: 119 MMHG | OXYGEN SATURATION: 100 % | HEART RATE: 58 BPM | HEIGHT: 61 IN | WEIGHT: 257 LBS | TEMPERATURE: 97 F | RESPIRATION RATE: 16 BRPM

## 2024-04-04 DIAGNOSIS — K59.00 CONSTIPATION, UNSPECIFIED CONSTIPATION TYPE: ICD-10-CM

## 2024-04-04 DIAGNOSIS — K59.00 CONSTIPATION: ICD-10-CM

## 2024-04-04 DIAGNOSIS — R10.84 GENERALIZED ABDOMINAL PAIN: Primary | ICD-10-CM

## 2024-04-04 LAB
ALBUMIN SERPL-MCNC: 3.4 G/DL (ref 3.5–5)
ALBUMIN/GLOB SERPL: 0.7 RATIO (ref 1.1–2)
ALP SERPL-CCNC: 62 UNIT/L (ref 40–150)
ALT SERPL-CCNC: 15 UNIT/L (ref 0–55)
APPEARANCE UR: CLEAR
AST SERPL-CCNC: 34 UNIT/L (ref 5–34)
B-HCG SERPL QL: NEGATIVE
BASOPHILS # BLD AUTO: 0.07 X10(3)/MCL
BASOPHILS NFR BLD AUTO: 0.8 %
BILIRUB SERPL-MCNC: 0.3 MG/DL
BILIRUB UR QL STRIP.AUTO: NEGATIVE
BUN SERPL-MCNC: 16.2 MG/DL (ref 7–18.7)
CALCIUM SERPL-MCNC: 8.6 MG/DL (ref 8.4–10.2)
CHLORIDE SERPL-SCNC: 108 MMOL/L (ref 98–107)
CO2 SERPL-SCNC: 21 MMOL/L (ref 22–29)
COLOR UR AUTO: YELLOW
CREAT SERPL-MCNC: 0.86 MG/DL (ref 0.55–1.02)
EOSINOPHIL # BLD AUTO: 0.17 X10(3)/MCL (ref 0–0.9)
EOSINOPHIL NFR BLD AUTO: 1.8 %
ERYTHROCYTE [DISTWIDTH] IN BLOOD BY AUTOMATED COUNT: 13.3 % (ref 11.5–17)
GFR SERPLBLD CREATININE-BSD FMLA CKD-EPI: >60 MLS/MIN/1.73/M2
GLOBULIN SER-MCNC: 5 GM/DL (ref 2.4–3.5)
GLUCOSE SERPL-MCNC: 88 MG/DL (ref 74–100)
GLUCOSE UR QL STRIP.AUTO: NEGATIVE
HCT VFR BLD AUTO: 39.4 % (ref 37–47)
HGB BLD-MCNC: 13.4 G/DL (ref 12–16)
IMM GRANULOCYTES # BLD AUTO: 0.02 X10(3)/MCL (ref 0–0.04)
IMM GRANULOCYTES NFR BLD AUTO: 0.2 %
KETONES UR QL STRIP.AUTO: NEGATIVE
LEUKOCYTE ESTERASE UR QL STRIP.AUTO: NEGATIVE
LYMPHOCYTES # BLD AUTO: 3.42 X10(3)/MCL (ref 0.6–4.6)
LYMPHOCYTES NFR BLD AUTO: 37.2 %
MCH RBC QN AUTO: 32.1 PG (ref 27–31)
MCHC RBC AUTO-ENTMCNC: 34 G/DL (ref 33–36)
MCV RBC AUTO: 94.3 FL (ref 80–94)
MONOCYTES # BLD AUTO: 0.7 X10(3)/MCL (ref 0.1–1.3)
MONOCYTES NFR BLD AUTO: 7.6 %
NEUTROPHILS # BLD AUTO: 4.82 X10(3)/MCL (ref 2.1–9.2)
NEUTROPHILS NFR BLD AUTO: 52.4 %
NITRITE UR QL STRIP.AUTO: NEGATIVE
NRBC BLD AUTO-RTO: 0 %
PH UR STRIP.AUTO: 7 [PH]
PLATELET # BLD AUTO: 279 X10(3)/MCL (ref 130–400)
PMV BLD AUTO: 10.6 FL (ref 7.4–10.4)
POTASSIUM SERPL-SCNC: 4.9 MMOL/L (ref 3.5–5.1)
PROT SERPL-MCNC: 8.4 GM/DL (ref 6.4–8.3)
PROT UR QL STRIP.AUTO: NEGATIVE
RBC # BLD AUTO: 4.18 X10(6)/MCL (ref 4.2–5.4)
RBC UR QL AUTO: NEGATIVE
SODIUM SERPL-SCNC: 136 MMOL/L (ref 136–145)
SP GR UR STRIP.AUTO: 1.02 (ref 1–1.03)
UROBILINOGEN UR STRIP-ACNC: 1
WBC # SPEC AUTO: 9.2 X10(3)/MCL (ref 4.5–11.5)

## 2024-04-04 PROCEDURE — 80053 COMPREHEN METABOLIC PANEL: CPT | Performed by: STUDENT IN AN ORGANIZED HEALTH CARE EDUCATION/TRAINING PROGRAM

## 2024-04-04 PROCEDURE — 81003 URINALYSIS AUTO W/O SCOPE: CPT | Performed by: STUDENT IN AN ORGANIZED HEALTH CARE EDUCATION/TRAINING PROGRAM

## 2024-04-04 PROCEDURE — 63600175 PHARM REV CODE 636 W HCPCS: Performed by: STUDENT IN AN ORGANIZED HEALTH CARE EDUCATION/TRAINING PROGRAM

## 2024-04-04 PROCEDURE — 81025 URINE PREGNANCY TEST: CPT | Performed by: STUDENT IN AN ORGANIZED HEALTH CARE EDUCATION/TRAINING PROGRAM

## 2024-04-04 PROCEDURE — 96360 HYDRATION IV INFUSION INIT: CPT

## 2024-04-04 PROCEDURE — 99284 EMERGENCY DEPT VISIT MOD MDM: CPT | Mod: 25

## 2024-04-04 PROCEDURE — 85025 COMPLETE CBC W/AUTO DIFF WBC: CPT | Performed by: STUDENT IN AN ORGANIZED HEALTH CARE EDUCATION/TRAINING PROGRAM

## 2024-04-04 RX ORDER — POLYETHYLENE GLYCOL 3350 17 G/17G
17 POWDER, FOR SOLUTION ORAL DAILY
Qty: 595 G | Refills: 0 | Status: SHIPPED | OUTPATIENT
Start: 2024-04-04

## 2024-04-04 RX ADMIN — SODIUM CHLORIDE, POTASSIUM CHLORIDE, SODIUM LACTATE AND CALCIUM CHLORIDE 1000 ML: 600; 310; 30; 20 INJECTION, SOLUTION INTRAVENOUS at 09:04

## 2024-04-05 NOTE — ED PROVIDER NOTES
Encounter Date: 2024       History     Chief Complaint   Patient presents with    Abdominal Pain    Diarrhea     HPI    23-year-old female with a past medical history of obesity presents emergency department for generalized abdominal achiness that has been going on for a week.  States that she had some nausea yesterday and had some diarrhea a few days ago after she took some eyes Linzess because she was concerned she is constipated.  States that she is only having small amounts of bowel movements.  States that she has been working out recently and has some tenderness to the muscles of her abdomen but that is improving.  She states that she was doing really well with her diet but over last week started eating lots of fast food when the abdominal bloating this started.  She is concerned she may be constipated.  No fevers.    Review of patient's allergies indicates:   Allergen Reactions    Pecan nut Anaphylaxis    Fish containing products Rash     History reviewed. No pertinent past medical history.  Past Surgical History:   Procedure Laterality Date     SECTION      TONSILLECTOMY       Family History   Problem Relation Age of Onset    Hypertension Father     Diabetes Father      Social History     Tobacco Use    Smoking status: Never    Smokeless tobacco: Never   Substance Use Topics    Alcohol use: Yes     Comment: occassionally    Drug use: Never     Review of Systems   Constitutional:  Negative for fever.   HENT:  Negative for sore throat.    Respiratory:  Negative for cough and shortness of breath.    Cardiovascular:  Negative for chest pain.   Gastrointestinal:  Positive for abdominal distention, abdominal pain and constipation. Negative for diarrhea, nausea and vomiting.   Genitourinary:  Negative for dysuria.   Musculoskeletal:  Negative for back pain.   Skin:  Negative for rash.   Neurological:  Negative for weakness and headaches.   Hematological:  Does not bruise/bleed easily.   All other systems  reviewed and are negative.      Physical Exam     Initial Vitals [04/04/24 1924]   BP Pulse Resp Temp SpO2   119/87 (!) 58 16 97 °F (36.1 °C) 100 %      MAP       --         Physical Exam    Nursing note and vitals reviewed.  Constitutional: She appears well-developed and well-nourished. She is Obese . No distress.   Cardiovascular:  Normal rate and regular rhythm.           Pulmonary/Chest: Breath sounds normal. No respiratory distress. She has no wheezes. She has no rhonchi. She has no rales.   Abdominal: Abdomen is soft. There is no abdominal tenderness. There is no rebound and no guarding.   Musculoskeletal:         General: No tenderness. Normal range of motion.     Neurological: She is alert and oriented to person, place, and time. She has normal strength.   Skin: Skin is warm. Capillary refill takes less than 2 seconds.         ED Course   Procedures  Labs Reviewed   COMPREHENSIVE METABOLIC PANEL - Abnormal; Notable for the following components:       Result Value    Chloride 108 (*)     Carbon Dioxide 21 (*)     Protein Total 8.4 (*)     Albumin Level 3.4 (*)     Globulin 5.0 (*)     Albumin/Globulin Ratio 0.7 (*)     All other components within normal limits   CBC WITH DIFFERENTIAL - Abnormal; Notable for the following components:    RBC 4.18 (*)     MCV 94.3 (*)     MCH 32.1 (*)     MPV 10.6 (*)     All other components within normal limits   URINALYSIS - Normal   PREGNANCY TEST, URINE RAPID - Normal   CBC W/ AUTO DIFFERENTIAL    Narrative:     The following orders were created for panel order CBC auto differential.  Procedure                               Abnormality         Status                     ---------                               -----------         ------                     CBC with Differential[[id number]]        Abnormal            Final result                 Please view results for these tests on the individual orders.          Imaging Results              X-Ray Abdomen AP 1 View (KUB)  (Preliminary result)  Result time 04/04/24 22:50:45      Wet Read by Salo Khan MD (04/04/24 22:50:45, [facility] - Emergency Dept, Emergency Medicine)    Under penetrated although moderate stool burden                                     Medications   lactated ringers bolus 1,000 mL (1,000 mLs Intravenous New Bag 4/4/24 2157)     Medical Decision Making  differential diagnosis  Constipation, gastroenteritis, UTI, pregnancy,  as well as multiple other possible etiologies      Amount and/or Complexity of Data Reviewed  Labs: ordered. Decision-making details documented in ED Course.  Radiology: ordered and independent interpretation performed.    Risk  Risk Details: Offered CT of the abdomen secondary to pain.  Patient rather hold off with this.  X-ray does show some constipation.  Will treat accordingly.  ER precautions given               ED Course as of 04/04/24 2252   Thu Apr 04, 2024 2155 WBC: 9.20 [BS]   2155 Hemoglobin: 13.4 [BS]   2155 Hematocrit: 39.4 [BS]   2155 Platelet Count: 279 [BS]   2247 Sodium: 136 [BS]   2247 Potassium: 4.9 [BS]   2247 Chloride(!): 108 [BS]   2247 CO2(!): 21 [BS]   2247 Glucose: 88 [BS]   2247 BUN: 16.2 [BS]   2247 Creatinine: 0.86 [BS]   2251 Offered CT of the abdomen secondary to pain.  Patient rather hold off with this.  X-ray does show some constipation.  Will treat accordingly.  ER precautions given [BS]      ED Course User Index  [BS] Salo Khan MD                             Clinical Impression:  Final diagnoses:  [K59.00] Constipation  [R10.84] Generalized abdominal pain (Primary)  [K59.00] Constipation, unspecified constipation type          ED Disposition Condition    Discharge Stable          ED Prescriptions       Medication Sig Dispense Start Date End Date Auth. Provider    polyethylene glycol (GLYCOLAX) 17 gram/dose powder Take 17 g by mouth once daily. 595 g 4/4/2024 -- Salo Khan MD          Follow-up Information        Follow up With Specialties Details Why Contact Info    [place] [facility] - Emergency Dept Emergency Medicine Go to  If symptoms worsen [address]  [facility] [phone]             Salo Khan MD  04/04/24 5900

## 2024-05-07 ENCOUNTER — HOSPITAL ENCOUNTER (EMERGENCY)
Facility: HOSPITAL | Age: 24
Discharge: HOME | End: 2024-05-07
Attending: STUDENT IN AN ORGANIZED HEALTH CARE EDUCATION/TRAINING PROGRAM

## 2024-05-07 VITALS
TEMPERATURE: 99 F | BODY MASS INDEX: 46.01 KG/M2 | HEART RATE: 60 BPM | WEIGHT: 250 LBS | OXYGEN SATURATION: 100 % | RESPIRATION RATE: 18 BRPM | DIASTOLIC BLOOD PRESSURE: 85 MMHG | HEIGHT: 62 IN | SYSTOLIC BLOOD PRESSURE: 133 MMHG

## 2024-05-07 DIAGNOSIS — R10.9 RIGHT-SIDED ABDOMINAL PAIN OF UNKNOWN ETIOLOGY: Primary | ICD-10-CM

## 2024-05-07 LAB
ALBUMIN SERPL-MCNC: 3.5 G/DL (ref 3.5–5)
ALBUMIN/GLOB SERPL: 0.8 RATIO (ref 1.1–2)
ALP SERPL-CCNC: 64 UNIT/L (ref 40–150)
ALT SERPL-CCNC: 16 UNIT/L (ref 0–55)
APPEARANCE UR: CLEAR
AST SERPL-CCNC: 27 UNIT/L (ref 5–34)
B-HCG SERPL QL: NEGATIVE
BASOPHILS # BLD AUTO: 0.04 X10(3)/MCL
BASOPHILS NFR BLD AUTO: 0.4 %
BILIRUB SERPL-MCNC: 0.6 MG/DL
BILIRUB UR QL STRIP.AUTO: NEGATIVE
BUN SERPL-MCNC: 13.7 MG/DL (ref 7–18.7)
CALCIUM SERPL-MCNC: 8.5 MG/DL (ref 8.4–10.2)
CHLORIDE SERPL-SCNC: 107 MMOL/L (ref 98–107)
CO2 SERPL-SCNC: 23 MMOL/L (ref 22–29)
COLOR UR AUTO: NORMAL
CREAT SERPL-MCNC: 0.82 MG/DL (ref 0.55–1.02)
EOSINOPHIL # BLD AUTO: 0.15 X10(3)/MCL (ref 0–0.9)
EOSINOPHIL NFR BLD AUTO: 1.5 %
ERYTHROCYTE [DISTWIDTH] IN BLOOD BY AUTOMATED COUNT: 13.2 % (ref 11.5–17)
GFR SERPLBLD CREATININE-BSD FMLA CKD-EPI: >60 MLS/MIN/1.73/M2
GLOBULIN SER-MCNC: 4.3 GM/DL (ref 2.4–3.5)
GLUCOSE SERPL-MCNC: 86 MG/DL (ref 74–100)
GLUCOSE UR QL STRIP.AUTO: NEGATIVE
HCT VFR BLD AUTO: 36.3 % (ref 37–47)
HGB BLD-MCNC: 12.6 G/DL (ref 12–16)
IMM GRANULOCYTES # BLD AUTO: 0.02 X10(3)/MCL (ref 0–0.04)
IMM GRANULOCYTES NFR BLD AUTO: 0.2 %
KETONES UR QL STRIP.AUTO: NEGATIVE
LEUKOCYTE ESTERASE UR QL STRIP.AUTO: NEGATIVE
LIPASE SERPL-CCNC: 16 U/L
LYMPHOCYTES # BLD AUTO: 3.09 X10(3)/MCL (ref 0.6–4.6)
LYMPHOCYTES NFR BLD AUTO: 31.1 %
MCH RBC QN AUTO: 32.4 PG (ref 27–31)
MCHC RBC AUTO-ENTMCNC: 34.7 G/DL (ref 33–36)
MCV RBC AUTO: 93.3 FL (ref 80–94)
MONOCYTES # BLD AUTO: 0.75 X10(3)/MCL (ref 0.1–1.3)
MONOCYTES NFR BLD AUTO: 7.5 %
NEUTROPHILS # BLD AUTO: 5.89 X10(3)/MCL (ref 2.1–9.2)
NEUTROPHILS NFR BLD AUTO: 59.3 %
NITRITE UR QL STRIP.AUTO: NEGATIVE
NRBC BLD AUTO-RTO: 0 %
PH UR STRIP.AUTO: 6 [PH]
PLATELET # BLD AUTO: 298 X10(3)/MCL (ref 130–400)
PMV BLD AUTO: 9.5 FL (ref 7.4–10.4)
POTASSIUM SERPL-SCNC: 4.5 MMOL/L (ref 3.5–5.1)
PROT SERPL-MCNC: 7.8 GM/DL (ref 6.4–8.3)
PROT UR QL STRIP.AUTO: NEGATIVE
RBC # BLD AUTO: 3.89 X10(6)/MCL (ref 4.2–5.4)
RBC UR QL AUTO: NEGATIVE
SODIUM SERPL-SCNC: 138 MMOL/L (ref 136–145)
SP GR UR STRIP.AUTO: 1.02 (ref 1–1.03)
UROBILINOGEN UR STRIP-ACNC: 0.2
WBC # SPEC AUTO: 9.94 X10(3)/MCL (ref 4.5–11.5)

## 2024-05-07 PROCEDURE — 81025 URINE PREGNANCY TEST: CPT | Performed by: STUDENT IN AN ORGANIZED HEALTH CARE EDUCATION/TRAINING PROGRAM

## 2024-05-07 PROCEDURE — 80053 COMPREHEN METABOLIC PANEL: CPT | Performed by: STUDENT IN AN ORGANIZED HEALTH CARE EDUCATION/TRAINING PROGRAM

## 2024-05-07 PROCEDURE — 96374 THER/PROPH/DIAG INJ IV PUSH: CPT

## 2024-05-07 PROCEDURE — 81003 URINALYSIS AUTO W/O SCOPE: CPT | Performed by: STUDENT IN AN ORGANIZED HEALTH CARE EDUCATION/TRAINING PROGRAM

## 2024-05-07 PROCEDURE — 85025 COMPLETE CBC W/AUTO DIFF WBC: CPT | Performed by: STUDENT IN AN ORGANIZED HEALTH CARE EDUCATION/TRAINING PROGRAM

## 2024-05-07 PROCEDURE — 83690 ASSAY OF LIPASE: CPT | Performed by: STUDENT IN AN ORGANIZED HEALTH CARE EDUCATION/TRAINING PROGRAM

## 2024-05-07 PROCEDURE — 99285 EMERGENCY DEPT VISIT HI MDM: CPT | Mod: 25

## 2024-05-07 PROCEDURE — 25500020 PHARM REV CODE 255: Performed by: STUDENT IN AN ORGANIZED HEALTH CARE EDUCATION/TRAINING PROGRAM

## 2024-05-07 PROCEDURE — 96361 HYDRATE IV INFUSION ADD-ON: CPT

## 2024-05-07 PROCEDURE — 63600175 PHARM REV CODE 636 W HCPCS: Performed by: STUDENT IN AN ORGANIZED HEALTH CARE EDUCATION/TRAINING PROGRAM

## 2024-05-07 RX ORDER — ONDANSETRON HYDROCHLORIDE 2 MG/ML
4 INJECTION, SOLUTION INTRAVENOUS
Status: COMPLETED | OUTPATIENT
Start: 2024-05-07 | End: 2024-05-07

## 2024-05-07 RX ADMIN — ONDANSETRON 4 MG: 2 INJECTION INTRAMUSCULAR; INTRAVENOUS at 02:05

## 2024-05-07 RX ADMIN — SODIUM CHLORIDE, POTASSIUM CHLORIDE, SODIUM LACTATE AND CALCIUM CHLORIDE 1000 ML: 600; 310; 30; 20 INJECTION, SOLUTION INTRAVENOUS at 02:05

## 2024-05-07 RX ADMIN — IOHEXOL 100 ML: 350 INJECTION, SOLUTION INTRAVENOUS at 03:05

## 2024-05-07 NOTE — ED PROVIDER NOTES
Encounter Date: 2024       History     Chief Complaint   Patient presents with    Abdominal Pain     HPI    23-year-old female with a past medical history of obesity presents emergency department for right-sided upper and lower abdominal pain has been going for last 2-3 days.  Patient states it is worse after she eats.  No fevers.  States it does cause her to have some nausea.  Denies any previous history of any abdominal surgeries.    Review of patient's allergies indicates:   Allergen Reactions    Pecan nut Anaphylaxis    Fish containing products Rash     No past medical history on file.  Past Surgical History:   Procedure Laterality Date     SECTION      TONSILLECTOMY       Family History   Problem Relation Name Age of Onset    Hypertension Father      Diabetes Father       Social History     Tobacco Use    Smoking status: Never    Smokeless tobacco: Never   Substance Use Topics    Alcohol use: Yes     Comment: occassionally    Drug use: Never     Review of Systems   Constitutional:  Negative for fever.   Respiratory:  Negative for cough.    Cardiovascular:  Negative for chest pain.   Gastrointestinal:  Positive for abdominal pain and nausea. Negative for constipation, diarrhea and vomiting.   Neurological:  Negative for headaches.   All other systems reviewed and are negative.      Physical Exam     Initial Vitals [24 0208]   BP Pulse Resp Temp SpO2   133/85 60 18 98.6 °F (37 °C) 100 %      MAP       --         Physical Exam    Nursing note and vitals reviewed.  Constitutional: She appears well-developed and well-nourished. She is Obese . No distress.   Cardiovascular:  Normal rate and regular rhythm.           Pulmonary/Chest: Breath sounds normal. No respiratory distress. She has no wheezes. She has no rhonchi. She has no rales.   Abdominal: Abdomen is soft. There is abdominal tenderness (generalized tenderness to the abdomen more so to the right upper quadrant). There is no rebound and no  guarding.   Musculoskeletal:         General: No tenderness. Normal range of motion.     Neurological: She is alert and oriented to person, place, and time. She has normal strength.   Skin: Skin is warm. Capillary refill takes less than 2 seconds.         ED Course   Procedures  Labs Reviewed   COMPREHENSIVE METABOLIC PANEL - Abnormal; Notable for the following components:       Result Value    Globulin 4.3 (*)     Albumin/Globulin Ratio 0.8 (*)     All other components within normal limits   CBC WITH DIFFERENTIAL - Abnormal; Notable for the following components:    RBC 3.89 (*)     Hct 36.3 (*)     MCH 32.4 (*)     All other components within normal limits   LIPASE - Normal   PREGNANCY TEST, URINE RAPID - Normal   URINALYSIS, REFLEX TO URINE CULTURE - Normal   CBC W/ AUTO DIFFERENTIAL    Narrative:     The following orders were created for panel order CBC auto differential.  Procedure                               Abnormality         Status                     ---------                               -----------         ------                     CBC with Differential[[id number]]       Abnormal            Final result                 Please view results for these tests on the individual orders.          Imaging Results              CT Abdomen Pelvis With IV Contrast NO Oral Contrast (Preliminary result)  Result time 05/07/24 03:36:44      Preliminary result by Geovanny Velásquez MD (05/07/24 03:36:44)                   Narrative:    START OF REPORT:  Technique: CT of the abdomen and pelvis was performed with axial images as well as sagittal and coronal reconstruction images with intravenous contrast.    Comparison: None available.    Clinical History: 23-year-old female with a past medical history of obesity presents emergency department for right-sided upper and lower abdominal pain has been going for last 2-3 days. Patient states it is worse after she eats. No fevers. States it does cause her to have some nausea.  Denies any previous history of any abdominal surgeries.    Dosage Information: Automated Exposure Control was utilized 729.01 mGy.cm.    Findings:  Lines and Tubes: None.  Thorax:  Lungs: There is minimal nonspecific dependent change at the lung bases. No focal infiltrate or consolidation is seen.  Pleura: No effusions or thickening.  Heart: The heart size is within normal limits.  Abdomen:  Abdominal Wall: No abdominal wall pathology is seen.  Liver: The liver appears unremarkable.  Biliary System: No intrahepatic or extrahepatic biliary duct dilatation is seen.  Gallbladder: The gallbladder appears unremarkable.  Pancreas: The pancreas appears unremarkable.  Spleen: There is a cystic focus in the spleen measuring 6.5 mm (series 2, image 28). The spleen is otherwise unremarkable.  Adrenals: The adrenal glands appear unremarkable.  Kidneys: The kidneys appear unremarkable with no stones cysts masses or hydronephrosis with IV contrast decreasing sensitivity and specificity for stones.  Aorta: The visualized abdominal aorta appears unremarkable.  IVC: Unremarkable.  Bowel:  Esophagus: The visualized distal esophagus appears unremarkable.  Stomach: The stomach appears unremarkable.  Duodenum: Unremarkable appearing duodenum.  Small Bowel: The small bowel appears unremarkable.  Colon: Nondistended.  Appendix: The appendix appears unremarkable and is seen on Image 65, Series 2 through Image 63, Series 2.  Peritoneum: No intraperitoneal free air or ascites is seen.    Pelvis:  Bladder: The bladder appears unremarkable.  Female:  Uterus: The uterus appears unremarkable for age. An intrauterine device is in place.  Ovaries: The ovaries appear unremarkable with probable dominant left sided physiologic cysts.    Bony structures:  Dorsal Spine: There is mild scattered spondylosis of the visualized dorsal spine.  Bony Pelvis: The visualized bony structures of the pelvis appear unremarkable.      Impression:  1. No acute  intraabdominal or pelvic solid organ or bowel pathology identified. Details and other findings as discussed above.                                         Medications   lactated ringers bolus 1,000 mL (1,000 mLs Intravenous New Bag 5/7/24 0229)   ondansetron injection 4 mg (4 mg Intravenous Given 5/7/24 0229)   iohexoL (OMNIPAQUE 350) injection 100 mL (100 mLs Intravenous Given 5/7/24 0315)     Medical Decision Making  differential diagnosis  Constipation, abdominal pain, cholecystitis, cholelithiasis, appendicitis, diverticulitis,  as well as multiple other possible etiologies      Problems Addressed:  Right-sided abdominal pain of unknown etiology: self-limited or minor problem    Amount and/or Complexity of Data Reviewed  Labs: ordered. Decision-making details documented in ED Course.  Radiology: ordered.    Risk  Prescription drug management.               ED Course as of 05/07/24 0340   Tue May 07, 2024   0236 Leukocyte Esterase, UA: Negative [BS]   0236 NITRITE UA: Negative [BS]   0236 hCG Qualitative, Urine: Negative [BS]   0236 WBC: 9.94 [BS]   0236 Hemoglobin: 12.6 [BS]   0236 Hematocrit(!): 36.3 [BS]   0236 Platelet Count: 298 [BS]   0340 Labs and CT negative.  Patient with no current pain.  Will discharge.  Reassurance given.  ER precautions given [BS]      ED Course User Index  [BS] Salo Khan MD                           Clinical Impression:  Final diagnoses:  [R10.9] Right-sided abdominal pain of unknown etiology (Primary)          ED Disposition Condition    Discharge Stable          ED Prescriptions    None       Follow-up Information       Follow up With Specialties Details Why Contact Info    [facility] - Emergency Dept Emergency Medicine Go to  If symptoms worsen [id number]  [facility] [id number] [phone]             Salo Khan MD  05/07/24 0340

## 2024-06-02 ENCOUNTER — HOSPITAL ENCOUNTER (EMERGENCY)
Facility: HOSPITAL | Age: 24
Discharge: HOME | End: 2024-06-02
Attending: EMERGENCY MEDICINE

## 2024-06-02 VITALS
RESPIRATION RATE: 18 BRPM | TEMPERATURE: 99 F | SYSTOLIC BLOOD PRESSURE: 123 MMHG | DIASTOLIC BLOOD PRESSURE: 82 MMHG | BODY MASS INDEX: 47.2 KG/M2 | HEART RATE: 68 BPM | WEIGHT: 250 LBS | OXYGEN SATURATION: 100 % | HEIGHT: 61 IN

## 2024-06-02 DIAGNOSIS — R10.9 ABDOMINAL CRAMPING: Primary | ICD-10-CM

## 2024-06-02 DIAGNOSIS — N89.8 VAGINAL DISCHARGE: ICD-10-CM

## 2024-06-02 LAB
ALBUMIN SERPL-MCNC: 3.4 G/DL (ref 3.5–5)
ALBUMIN/GLOB SERPL: 0.9 RATIO (ref 1.1–2)
ALP SERPL-CCNC: 62 UNIT/L (ref 40–150)
ALT SERPL-CCNC: 17 UNIT/L (ref 0–55)
ANION GAP SERPL CALC-SCNC: 9 MEQ/L
AST SERPL-CCNC: 29 UNIT/L (ref 5–34)
B-HCG UR QL: NEGATIVE
BASOPHILS # BLD AUTO: 0.06 X10(3)/MCL
BASOPHILS NFR BLD AUTO: 0.7 %
BILIRUB SERPL-MCNC: 0.4 MG/DL
BILIRUB UR QL STRIP.AUTO: NEGATIVE
BUN SERPL-MCNC: 15.4 MG/DL (ref 7–18.7)
CALCIUM SERPL-MCNC: 8.5 MG/DL (ref 8.4–10.2)
CHLORIDE SERPL-SCNC: 108 MMOL/L (ref 98–107)
CLARITY UR: CLEAR
CLUE CELLS VAG QL WET PREP: ABNORMAL
CO2 SERPL-SCNC: 25 MMOL/L (ref 22–29)
COLOR UR AUTO: YELLOW
CREAT SERPL-MCNC: 0.76 MG/DL (ref 0.55–1.02)
CREAT/UREA NIT SERPL: 20
EOSINOPHIL # BLD AUTO: 0.09 X10(3)/MCL (ref 0–0.9)
EOSINOPHIL NFR BLD AUTO: 1.1 %
ERYTHROCYTE [DISTWIDTH] IN BLOOD BY AUTOMATED COUNT: 13.2 % (ref 11.5–17)
GFR SERPLBLD CREATININE-BSD FMLA CKD-EPI: >60 ML/MIN/1.73/M2
GLOBULIN SER-MCNC: 3.9 GM/DL (ref 2.4–3.5)
GLUCOSE SERPL-MCNC: 94 MG/DL (ref 74–100)
GLUCOSE UR QL STRIP: NEGATIVE
HCT VFR BLD AUTO: 37.4 % (ref 37–47)
HGB BLD-MCNC: 12.9 G/DL (ref 12–16)
HGB UR QL STRIP: NEGATIVE
IMM GRANULOCYTES # BLD AUTO: 0.02 X10(3)/MCL (ref 0–0.04)
IMM GRANULOCYTES NFR BLD AUTO: 0.2 %
KETONES UR QL STRIP: NEGATIVE
KOH PREP SPEC: NORMAL
LEUKOCYTE ESTERASE UR QL STRIP: NEGATIVE
LYMPHOCYTES # BLD AUTO: 2.55 X10(3)/MCL (ref 0.6–4.6)
LYMPHOCYTES NFR BLD AUTO: 30.3 %
MCH RBC QN AUTO: 32.5 PG (ref 27–31)
MCHC RBC AUTO-ENTMCNC: 34.5 G/DL (ref 33–36)
MCV RBC AUTO: 94.2 FL (ref 80–94)
MONOCYTES # BLD AUTO: 0.69 X10(3)/MCL (ref 0.1–1.3)
MONOCYTES NFR BLD AUTO: 8.2 %
NEUTROPHILS # BLD AUTO: 5 X10(3)/MCL (ref 2.1–9.2)
NEUTROPHILS NFR BLD AUTO: 59.5 %
NITRITE UR QL STRIP: NEGATIVE
NRBC BLD AUTO-RTO: 0 %
PH UR STRIP: 7 [PH]
PLATELET # BLD AUTO: 338 X10(3)/MCL (ref 130–400)
PMV BLD AUTO: 9.4 FL (ref 7.4–10.4)
POTASSIUM SERPL-SCNC: 3.7 MMOL/L (ref 3.5–5.1)
PROT SERPL-MCNC: 7.3 GM/DL (ref 6.4–8.3)
PROT UR QL STRIP: NEGATIVE
RBC # BLD AUTO: 3.97 X10(6)/MCL (ref 4.2–5.4)
SODIUM SERPL-SCNC: 142 MMOL/L (ref 136–145)
SP GR UR STRIP.AUTO: 1.02 (ref 1–1.03)
T VAGINALIS VAG QL WET PREP: ABNORMAL
UROBILINOGEN UR STRIP-ACNC: 1
WBC # SPEC AUTO: 8.41 X10(3)/MCL (ref 4.5–11.5)
WBC #/AREA VAG WET PREP: ABNORMAL
YEAST SPEC QL WET PREP: ABNORMAL

## 2024-06-02 PROCEDURE — 96372 THER/PROPH/DIAG INJ SC/IM: CPT | Performed by: EMERGENCY MEDICINE

## 2024-06-02 PROCEDURE — 87210 SMEAR WET MOUNT SALINE/INK: CPT | Performed by: EMERGENCY MEDICINE

## 2024-06-02 PROCEDURE — 80053 COMPREHEN METABOLIC PANEL: CPT | Performed by: EMERGENCY MEDICINE

## 2024-06-02 PROCEDURE — 87220 TISSUE EXAM FOR FUNGI: CPT | Performed by: EMERGENCY MEDICINE

## 2024-06-02 PROCEDURE — 25000003 PHARM REV CODE 250: Performed by: EMERGENCY MEDICINE

## 2024-06-02 PROCEDURE — 99285 EMERGENCY DEPT VISIT HI MDM: CPT | Mod: 25

## 2024-06-02 PROCEDURE — 63700000 PHARM REV CODE 250 ALT 637 W/O HCPCS: Performed by: EMERGENCY MEDICINE

## 2024-06-02 PROCEDURE — 87491 CHLMYD TRACH DNA AMP PROBE: CPT | Performed by: EMERGENCY MEDICINE

## 2024-06-02 PROCEDURE — 85025 COMPLETE CBC W/AUTO DIFF WBC: CPT | Performed by: EMERGENCY MEDICINE

## 2024-06-02 PROCEDURE — 63600175 PHARM REV CODE 636 W HCPCS: Performed by: EMERGENCY MEDICINE

## 2024-06-02 PROCEDURE — 81025 URINE PREGNANCY TEST: CPT | Performed by: EMERGENCY MEDICINE

## 2024-06-02 PROCEDURE — 81003 URINALYSIS AUTO W/O SCOPE: CPT | Performed by: EMERGENCY MEDICINE

## 2024-06-02 PROCEDURE — 87591 N.GONORRHOEAE DNA AMP PROB: CPT | Performed by: EMERGENCY MEDICINE

## 2024-06-02 RX ORDER — AZITHROMYCIN 250 MG/1
1000 TABLET, FILM COATED ORAL
Status: COMPLETED | OUTPATIENT
Start: 2024-06-02 | End: 2024-06-02

## 2024-06-02 RX ORDER — KETOROLAC TROMETHAMINE 30 MG/ML
30 INJECTION, SOLUTION INTRAMUSCULAR; INTRAVENOUS
Status: DISCONTINUED | OUTPATIENT
Start: 2024-06-02 | End: 2024-06-02

## 2024-06-02 RX ADMIN — AZITHROMYCIN DIHYDRATE 1000 MG: 250 TABLET, FILM COATED ORAL at 09:06

## 2024-06-02 RX ADMIN — LIDOCAINE HYDROCHLORIDE 0.5 G: 10 INJECTION, SOLUTION INFILTRATION; PERINEURAL at 09:06

## 2024-06-02 NOTE — ED TRIAGE NOTES
"  Pt complaint of abd cramping  x 3 days without bleeding relating that she was had normal period a week ago. Pt relates that she has a vag dischage as "white and sometimes brown color" and expresses some concernf for possible STD.     "

## 2024-06-03 LAB
C TRACH DNA SPEC QL NAA+PROBE: NOT DETECTED
N GONORRHOEA DNA SPEC QL NAA+PROBE: NOT DETECTED
SOURCE (OHS): NORMAL

## 2024-06-03 NOTE — ED PROVIDER NOTES
"Encounter Date: 2024       History     Chief Complaint   Patient presents with    Abdominal Cramping     Pt complaint of abd cramping  x 3 days without bleeding relating that she was had normal period a week ago. Pt relates that she has a vag dischage as "white and sometimes brown color" and expresses some concernf for possible STD.     24-year-old female history of constipation presents with 3 days of lower abdominal cramping.  Patient has increased urinary frequency.  No dysuria.  Last menstrual period a week ago.  Patient denies vaginal bleeding says she does have a white vaginal discharge and odor.  +nausea but no vomiting, diarrhea    The history is provided by the patient.     Review of patient's allergies indicates:   Allergen Reactions    Pecan nut Anaphylaxis    Fish containing products Rash     No past medical history on file.  Past Surgical History:   Procedure Laterality Date     SECTION      TONSILLECTOMY       Family History   Problem Relation Name Age of Onset    Hypertension Father      Diabetes Father       Social History     Tobacco Use    Smoking status: Never    Smokeless tobacco: Never   Substance Use Topics    Alcohol use: Yes     Comment: occassionally    Drug use: Never     Review of Systems   Constitutional:  Negative for chills, diaphoresis, fatigue and fever.   HENT:  Negative for congestion, drooling, ear discharge, ear pain, postnasal drip, rhinorrhea, sinus pressure, sinus pain, sore throat and tinnitus.    Eyes:  Negative for discharge.   Respiratory:  Negative for cough, chest tightness, shortness of breath and wheezing.    Cardiovascular:  Negative for chest pain and palpitations.   Gastrointestinal:  Positive for abdominal pain and nausea. Negative for diarrhea and vomiting.   Genitourinary:  Positive for frequency, pelvic pain and vaginal discharge. Negative for dysuria, flank pain, hematuria, urgency, vaginal bleeding and vaginal pain.   Musculoskeletal:  Negative for " back pain, neck pain and neck stiffness.   Skin:  Negative for rash.   Neurological:  Negative for syncope, weakness, light-headedness, numbness and headaches.   Psychiatric/Behavioral:  Negative for suicidal ideas.        Physical Exam     Initial Vitals [06/02/24 1841]   BP Pulse Resp Temp SpO2   (!) 124/100 69 18 98.6 °F (37 °C) 100 %      MAP       --         Physical Exam    Nursing note and vitals reviewed.  Constitutional: She appears well-developed.   HENT:   Mouth/Throat: Oropharynx is clear and moist.   Eyes: Conjunctivae are normal.   Neck:   Normal range of motion.  Cardiovascular:  Normal rate.           Abdominal: Abdomen is soft. There is no abdominal tenderness. There is no guarding.   Musculoskeletal:         General: Normal range of motion.      Cervical back: Normal range of motion.     Neurological: She is alert and oriented to person, place, and time.   Skin: Skin is warm and dry.         ED Course   Procedures  Labs Reviewed   WET PREP, GENITAL - Abnormal; Notable for the following components:       Result Value    WBC, Wet Prep Occasional (*)     All other components within normal limits   COMPREHENSIVE METABOLIC PANEL - Abnormal; Notable for the following components:    Chloride 108 (*)     Albumin 3.4 (*)     Globulin 3.9 (*)     Albumin/Globulin Ratio 0.9 (*)     All other components within normal limits   CBC WITH DIFFERENTIAL - Abnormal; Notable for the following components:    RBC 3.97 (*)     MCV 94.2 (*)     MCH 32.5 (*)     All other components within normal limits   URINALYSIS, REFLEX TO URINE CULTURE - Normal   PREGNANCY TEST, URINE RAPID - Normal   KOH PREP OLG   CHLAMYDIA/GONORRHOEAE(GC), PCR   CBC W/ AUTO DIFFERENTIAL    Narrative:     The following orders were created for panel order CBC auto differential.  Procedure                               Abnormality         Status                     ---------                               -----------         ------                      CBC with Differential[[id number]]       Abnormal            Final result                 Please view results for these tests on the individual orders.          Imaging Results              CT Abdomen Pelvis  Without Contrast (Final result)  Result time 06/02/24 21:03:25      Final result by Trace Green MD (06/02/24 21:03:25)                   Impression:      No abdominopelvic visceral acute findings identified.      Electronically signed by: Trace Green  Date:    06/02/2024  Time:    21:03               Narrative:    EXAMINATION:  CT ABDOMEN PELVIS WITHOUT CONTRAST    CLINICAL HISTORY:  RLQ abdominal pain (Age >= 14y);    TECHNIQUE:  Multidetector axial images were obtained from the  diaphragms to below symphysis pubis without the administration of IV contrast. Oral contrast was not administered.    Dose length product of 779 mGycm. Automated exposure control was utilized to minimize radiation dose.    COMPARISON:  May 7, 2024    FINDINGS:  Included lungs are without suspicious nodularity, acute air space infiltrates or fluid within the pleural spaces.  There is similar left pericardial well-defined density measuring 1.4 cm and could represent a mildly enlarged lymph node.    Within limitations of noncontrast technique, no acute findings of the liver, pancreas and spleen identified. Gallbladder wall is not thickened and there is no intraluminal calcified calculus. No apparent biliary dilation.    The adrenal glands noncontrast evaluation is unremarkable. The kidneys are unremarkable in size and contour. There is no hydronephrosis or nephrolithiasis. The ureters appear normal in course and diameter without intra ureteral stone.    Stomach is filled with residual ingested meal.    There is no dilatation of small bowel loops.  Appendix is unremarkable and is seen on image 54 series 4.  No abnormal dilatation of the colon pericolonic acute standings.  No bowel obstruction.  No free fluid.    Urinary bladder  appears within normal limits. No intravesical stone identified.  There is an intrauterine device.  There is no pelvic free fluid.                                       Medications   cefTRIAXone (Rocephin) 0.5 g in LIDOcaine HCL 10 mg/ml (1%) 2 mL IM only syringe (0.5 g Intramuscular Given 6/2/24 2151)   azithromycin tablet 1,000 mg (1,000 mg Oral Given 6/2/24 2153)     Medical Decision Making  Medical Decision Making  Problem list/ differential diagnosis including but not limited to:  Ectopic preg, miscarriage, pregnant, cervicitis, endometritis, PID, vaginitis, ovarian cyst/ torsion/ abscess, pelvic congestion syndrome, uti/ pyelonephritis, dysmenorrhea, pelvic congestion syndrome, gastroenteritis, appendicitis, bowel obstruction, diverticulitis, inflammatory bowel, ibs, mesenteric adenititis    Patient's chronic illnesses impacting care:  none      Diagnostic test considered but not ordered:      My interpretations:    Radiology reports      Discussion of case with external qualified healthcare professionals:  Not applicable      Review of external notes( inpt, ems, NH, clinic):      Decision rules/scores:    Medications reviewed:  Medications ordered in the ER:  Ceftriaxone and azithromycin  Discharge prescriptions:    Social variables possible impacting patient's healthcare:    Code status/discussion    Shared decision making:  Patient elects to do self swab instead of pelvic exam.  Patient refused IV contrast.  She says it does not like the way it makes her body feel.  I explained that it lowers the sensitivity of the CT scan for appendicitis.  Patient understood    Consideration for admission versus discharge: stable for discharge     Amount and/or Complexity of Data Reviewed  Labs: ordered. Decision-making details documented in ED Course.  Radiology: ordered.    Risk  Prescription drug management.               ED Course as of 06/02/24 2159   Sun Jun 02, 2024 1949 Clue Cells, Wet Prep: None Seen [WC]   1949  Trichomonas, Wet Prep: None Seen [WC]   1949 Yeast, Wet Prep: None Seen [WC]   1949 KOH Prep: No fungal elements seen [WC]   2008 hCG Qualitative, Urine: Negative [WC]   2127 CT abdomen pelvis negative [WC]      ED Course User Index  [WC] Mahesh Hung MD                           Clinical Impression:  Final diagnoses:  [R10.9] Abdominal cramping (Primary)  [N89.8] Vaginal discharge          ED Disposition Condition    Discharge Good          ED Prescriptions    None       Follow-up Information       Follow up With Specialties Details Why Contact Info    [facility] - Emergency Dept Emergency Medicine  If symptoms worsen [id number]  [facility] [id number] [phone]             Mahesh Hung MD  06/02/24 1512

## 2024-06-03 NOTE — DISCHARGE INSTRUCTIONS
Follow up with your primary care doctor within a week  Your gonorrhea and chlamydia test will take several days to result.  If they are positive you have already been treated however you will need to have your partner treated as well.

## 2024-06-06 ENCOUNTER — HOSPITAL ENCOUNTER (EMERGENCY)
Facility: HOSPITAL | Age: 24
Discharge: HOME | End: 2024-06-06
Attending: STUDENT IN AN ORGANIZED HEALTH CARE EDUCATION/TRAINING PROGRAM

## 2024-06-06 VITALS
HEART RATE: 67 BPM | WEIGHT: 250 LBS | DIASTOLIC BLOOD PRESSURE: 65 MMHG | SYSTOLIC BLOOD PRESSURE: 114 MMHG | RESPIRATION RATE: 16 BRPM | TEMPERATURE: 99 F | OXYGEN SATURATION: 99 % | BODY MASS INDEX: 47.2 KG/M2 | HEIGHT: 61 IN

## 2024-06-06 DIAGNOSIS — R10.84 GENERALIZED ABDOMINAL PAIN: ICD-10-CM

## 2024-06-06 DIAGNOSIS — R19.7 DIARRHEA, UNSPECIFIED TYPE: Primary | ICD-10-CM

## 2024-06-06 LAB
ALBUMIN SERPL-MCNC: 3.6 G/DL (ref 3.5–5)
ALBUMIN/GLOB SERPL: 0.8 RATIO (ref 1.1–2)
ALP SERPL-CCNC: 65 UNIT/L (ref 40–150)
ALT SERPL-CCNC: 18 UNIT/L (ref 0–55)
ANION GAP SERPL CALC-SCNC: 7 MEQ/L
AST SERPL-CCNC: 23 UNIT/L (ref 5–34)
B-HCG UR QL: NEGATIVE
BASOPHILS # BLD AUTO: 0.04 X10(3)/MCL
BASOPHILS NFR BLD AUTO: 0.5 %
BILIRUB SERPL-MCNC: 0.3 MG/DL
BILIRUB UR QL STRIP.AUTO: NEGATIVE
BUN SERPL-MCNC: 16.9 MG/DL (ref 7–18.7)
CALCIUM SERPL-MCNC: 8.8 MG/DL (ref 8.4–10.2)
CHLORIDE SERPL-SCNC: 109 MMOL/L (ref 98–107)
CLARITY UR: CLEAR
CO2 SERPL-SCNC: 24 MMOL/L (ref 22–29)
COLOR UR AUTO: NORMAL
CREAT SERPL-MCNC: 1.02 MG/DL (ref 0.55–1.02)
CREAT/UREA NIT SERPL: 17
EOSINOPHIL # BLD AUTO: 0.12 X10(3)/MCL (ref 0–0.9)
EOSINOPHIL NFR BLD AUTO: 1.4 %
ERYTHROCYTE [DISTWIDTH] IN BLOOD BY AUTOMATED COUNT: 13 % (ref 11.5–17)
GFR SERPLBLD CREATININE-BSD FMLA CKD-EPI: >60 ML/MIN/1.73/M2
GLOBULIN SER-MCNC: 4.3 GM/DL (ref 2.4–3.5)
GLUCOSE SERPL-MCNC: 97 MG/DL (ref 74–100)
GLUCOSE UR QL STRIP: NEGATIVE
HCT VFR BLD AUTO: 38.7 % (ref 37–47)
HGB BLD-MCNC: 13.3 G/DL (ref 12–16)
HGB UR QL STRIP: NEGATIVE
IMM GRANULOCYTES # BLD AUTO: 0.02 X10(3)/MCL (ref 0–0.04)
IMM GRANULOCYTES NFR BLD AUTO: 0.2 %
KETONES UR QL STRIP: NEGATIVE
LEUKOCYTE ESTERASE UR QL STRIP: NEGATIVE
LIPASE SERPL-CCNC: 24 U/L
LYMPHOCYTES # BLD AUTO: 2.79 X10(3)/MCL (ref 0.6–4.6)
LYMPHOCYTES NFR BLD AUTO: 31.7 %
MCH RBC QN AUTO: 32.3 PG (ref 27–31)
MCHC RBC AUTO-ENTMCNC: 34.4 G/DL (ref 33–36)
MCV RBC AUTO: 93.9 FL (ref 80–94)
MONOCYTES # BLD AUTO: 0.51 X10(3)/MCL (ref 0.1–1.3)
MONOCYTES NFR BLD AUTO: 5.8 %
NEUTROPHILS # BLD AUTO: 5.31 X10(3)/MCL (ref 2.1–9.2)
NEUTROPHILS NFR BLD AUTO: 60.4 %
NITRITE UR QL STRIP: NEGATIVE
NRBC BLD AUTO-RTO: 0 %
PH UR STRIP: 6 [PH]
PLATELET # BLD AUTO: 347 X10(3)/MCL (ref 130–400)
PMV BLD AUTO: 9.5 FL (ref 7.4–10.4)
POTASSIUM SERPL-SCNC: 3.5 MMOL/L (ref 3.5–5.1)
PROT SERPL-MCNC: 7.9 GM/DL (ref 6.4–8.3)
PROT UR QL STRIP: NEGATIVE
RBC # BLD AUTO: 4.12 X10(6)/MCL (ref 4.2–5.4)
SODIUM SERPL-SCNC: 140 MMOL/L (ref 136–145)
SP GR UR STRIP.AUTO: >=1.03 (ref 1–1.03)
UROBILINOGEN UR STRIP-ACNC: 0.2
WBC # SPEC AUTO: 8.79 X10(3)/MCL (ref 4.5–11.5)

## 2024-06-06 PROCEDURE — 81003 URINALYSIS AUTO W/O SCOPE: CPT

## 2024-06-06 PROCEDURE — 83690 ASSAY OF LIPASE: CPT

## 2024-06-06 PROCEDURE — 85025 COMPLETE CBC W/AUTO DIFF WBC: CPT

## 2024-06-06 PROCEDURE — 81025 URINE PREGNANCY TEST: CPT

## 2024-06-06 PROCEDURE — 99283 EMERGENCY DEPT VISIT LOW MDM: CPT

## 2024-06-06 PROCEDURE — 80053 COMPREHEN METABOLIC PANEL: CPT

## 2024-06-07 NOTE — ED PROVIDER NOTES
Encounter Date: 2024       History     Chief Complaint   Patient presents with    Abdominal Pain     Reports diarrhea and bloating since  after receiving antibiotics here.      24 y.o.  female presents to  Emergency Department with a chief complaint of diarrhea. Symptoms began several days ago and have been constant since onset. Reports she had 4 bowel movements on today, describes as watery. Associated symptoms include abdominal pain. Given antibiotics during ER visit 4 days ago. The patient denies CP, SOB, fever, vomiting, dizziness, or syncope. No other reported symptoms at this time      The history is provided by the patient. No  was used.   Abdominal Pain  The current episode started several days ago. The onset of the illness was abrupt. The problem has not changed since onset.The abdominal pain is generalized. The other symptoms of the illness include diarrhea. The other symptoms of the illness do not include fever, shortness of breath, nausea, vomiting or dysuria.   The diarrhea began 3 to 5 days ago. The diarrhea is watery. The diarrhea occurs 2 - 4 times per day.   The patient states that she believes she is currently not pregnant. Symptoms associated with the illness do not include chills, diaphoresis, heartburn, frequency or back pain.     Review of patient's allergies indicates:   Allergen Reactions    Pecan nut Anaphylaxis    Fish containing products Rash     History reviewed. No pertinent past medical history.  Past Surgical History:   Procedure Laterality Date     SECTION      TONSILLECTOMY       Family History   Problem Relation Name Age of Onset    Hypertension Father      Diabetes Father       Social History     Tobacco Use    Smoking status: Never    Smokeless tobacco: Never   Substance Use Topics    Alcohol use: Yes     Comment: occassionally    Drug use: Never     Review of Systems   Constitutional:  Negative for chills, diaphoresis and  fever.   Eyes:  Negative for photophobia and visual disturbance.   Respiratory:  Negative for cough, shortness of breath, wheezing and stridor.    Cardiovascular:  Negative for chest pain, palpitations and leg swelling.   Gastrointestinal:  Positive for abdominal pain and diarrhea. Negative for heartburn, nausea and vomiting.   Genitourinary:  Negative for dysuria, flank pain and frequency.   Musculoskeletal:  Negative for back pain, gait problem and joint swelling.   All other systems reviewed and are negative.      Physical Exam     Initial Vitals [06/06/24 1822]   BP Pulse Resp Temp SpO2   114/65 67 16 98.6 °F (37 °C) 99 %      MAP       --         Physical Exam    Nursing note and vitals reviewed.  Constitutional: She appears well-developed and well-nourished. She is not diaphoretic. She is Obese . She is cooperative.  Non-toxic appearance. No distress.   HENT:   Head: Normocephalic and atraumatic.   Right Ear: External ear normal.   Left Ear: External ear normal.   Nose: Nose normal.   Eyes: Conjunctivae and EOM are normal. Pupils are equal, round, and reactive to light.   Neck: Neck supple.   Normal range of motion.  Cardiovascular:  Normal rate, regular rhythm, normal heart sounds and intact distal pulses.           Pulmonary/Chest: Effort normal and breath sounds normal. No tachypnea and no bradypnea. No respiratory distress. She has no decreased breath sounds. She has no wheezes. She has no rhonchi. She has no rales. She exhibits no tenderness.   Abdominal: Abdomen is soft. Bowel sounds are normal. She exhibits no distension.   No tenderness to abdomen upon palpation.  There is no rebound and no guarding.   Musculoskeletal:         General: Normal range of motion.      Cervical back: Normal range of motion and neck supple.     Neurological: She is alert and oriented to person, place, and time. She has normal strength. No sensory deficit. GCS score is 15. GCS eye subscore is 4. GCS verbal subscore is 5. GCS  motor subscore is 6.   Skin: Skin is warm and dry. Capillary refill takes less than 2 seconds.   Psychiatric: She has a normal mood and affect. Thought content normal.         ED Course   Procedures  Labs Reviewed   COMPREHENSIVE METABOLIC PANEL - Abnormal; Notable for the following components:       Result Value    Chloride 109 (*)     Globulin 4.3 (*)     Albumin/Globulin Ratio 0.8 (*)     All other components within normal limits   CBC WITH DIFFERENTIAL - Abnormal; Notable for the following components:    RBC 4.12 (*)     MCH 32.3 (*)     All other components within normal limits   LIPASE - Normal   URINALYSIS, REFLEX TO URINE CULTURE - Normal   PREGNANCY TEST, URINE RAPID - Normal   STOOL CULTURE [facility]   CLOSTRIDIUM DIFFICILE TOXIN A AND B, EIA   CBC W/ AUTO DIFFERENTIAL    Narrative:     The following orders were created for panel order CBC W/ AUTO DIFFERENTIAL.  Procedure                               Abnormality         Status                     ---------                               -----------         ------                     CBC with Differential[[id number]]       Abnormal            Final result                 Please view results for these tests on the individual orders.   FECAL LEUKOCYTES - LACTOFERRIN ON  STOOL   GI PANEL          Imaging Results    None          Medications - No data to display  Medical Decision Making  Patient awake, alert, has non-labored breathing, and follows commands appropriately. Arrived to ED due to abdominal pain and diarrhea. Reports abdominal pain occurs when she has a bowel movement. Recently seen in ER for same complaints. Afebrile. NAD Noted.           Differential Diagnosis: Viral Gastroenteritis, Colitis, Diarrhea, Abdominal Pain    Amount and/or Complexity of Data Reviewed  Labs: ordered.     Details: Labs unremarkable. Unable to provide stool sample while in ER. Informed patient of results.   Discussion of management or test interpretation with external  provider(s): Discussed plan of care and interventions with patient. Agreed to and aware of plan of care. Comfortable being discharged home. Patient discharged home. Patient denies new or additional complaints; no further tests indicated at this time. Verbalized understanding of instructions. No emergent or apparent distress noted prior to discharge. To follow up with PCP in 1 week as needed. Strict ER return precautions given.       Risk  OTC drugs.               ED Course as of 06/06/24 2010   Thu Jun 06, 2024   9607 Patient seen on 06/02/24 for abdominal pain and diarrhea. Labs and imaging unremarkable. Rocephin and Azithromycin given in ER.  [JA]   2006 Labs unremarkable and patient could not give stool sample while in ER. Educated patient on diet to improve diarrhea. Will provide number to establish PCP for further evaluation. Comfortable being discharged home. Strict ER return precautions given.  [JA]      ED Course User Index  [JA] Shweta Baptiste, NP                           Clinical Impression:  Final diagnoses:  [R19.7] Diarrhea, unspecified type (Primary)  [R10.84] Generalized abdominal pain          ED Disposition Condition    Discharge Stable          ED Prescriptions    None       Follow-up Information       Follow up With Specialties Details Why Contact Info    PCP  Call in 1 week [phone] to establish a primary care provider.     [facility] - Emergency Dept Emergency Medicine Go to  If symptoms worsen, As needed [id number]  [facility] [id number] [phone]             Shweta Baptiste, NP  06/06/24 2011

## 2024-07-20 ENCOUNTER — HOSPITAL ENCOUNTER (EMERGENCY)
Facility: HOSPITAL | Age: 24
Discharge: HOME | End: 2024-07-20
Attending: EMERGENCY MEDICINE

## 2024-07-20 VITALS
TEMPERATURE: 99 F | WEIGHT: 250 LBS | OXYGEN SATURATION: 99 % | RESPIRATION RATE: 18 BRPM | HEIGHT: 61 IN | SYSTOLIC BLOOD PRESSURE: 119 MMHG | DIASTOLIC BLOOD PRESSURE: 82 MMHG | BODY MASS INDEX: 47.2 KG/M2 | HEART RATE: 83 BPM

## 2024-07-20 DIAGNOSIS — R11.2 NAUSEA VOMITING AND DIARRHEA: Primary | ICD-10-CM

## 2024-07-20 DIAGNOSIS — F10.10 EXCESSIVE DRINKING OF ALCOHOL: ICD-10-CM

## 2024-07-20 DIAGNOSIS — R19.7 NAUSEA VOMITING AND DIARRHEA: Primary | ICD-10-CM

## 2024-07-20 DIAGNOSIS — E86.0 DEHYDRATION: ICD-10-CM

## 2024-07-20 LAB
ALBUMIN SERPL-MCNC: 4 G/DL (ref 3.5–5)
ALBUMIN/GLOB SERPL: 0.9 RATIO (ref 1.1–2)
ALP SERPL-CCNC: 61 UNIT/L (ref 40–150)
ALT SERPL-CCNC: 19 UNIT/L (ref 0–55)
ANION GAP SERPL CALC-SCNC: 10 MEQ/L
AST SERPL-CCNC: 22 UNIT/L (ref 5–34)
B-HCG UR QL: NEGATIVE
BACTERIA #/AREA URNS AUTO: ABNORMAL /HPF
BASOPHILS # BLD AUTO: 0.05 X10(3)/MCL
BASOPHILS NFR BLD AUTO: 0.6 %
BILIRUB SERPL-MCNC: 0.4 MG/DL
BILIRUB UR QL STRIP.AUTO: NEGATIVE
BUN SERPL-MCNC: 12.3 MG/DL (ref 7–18.7)
CALCIUM SERPL-MCNC: 9.6 MG/DL (ref 8.4–10.2)
CHLORIDE SERPL-SCNC: 108 MMOL/L (ref 98–107)
CLARITY UR: ABNORMAL
CO2 SERPL-SCNC: 24 MMOL/L (ref 22–29)
COLOR UR AUTO: YELLOW
CREAT SERPL-MCNC: 0.85 MG/DL (ref 0.55–1.02)
CREAT/UREA NIT SERPL: 14
EOSINOPHIL # BLD AUTO: 0.02 X10(3)/MCL (ref 0–0.9)
EOSINOPHIL NFR BLD AUTO: 0.2 %
ERYTHROCYTE [DISTWIDTH] IN BLOOD BY AUTOMATED COUNT: 13.5 % (ref 11.5–17)
GFR SERPLBLD CREATININE-BSD FMLA CKD-EPI: >60 ML/MIN/1.73/M2
GLOBULIN SER-MCNC: 4.6 GM/DL (ref 2.4–3.5)
GLUCOSE SERPL-MCNC: 96 MG/DL (ref 74–100)
GLUCOSE UR QL STRIP: NEGATIVE
HCT VFR BLD AUTO: 40.4 % (ref 37–47)
HGB BLD-MCNC: 14 G/DL (ref 12–16)
HGB UR QL STRIP: NEGATIVE
IMM GRANULOCYTES # BLD AUTO: 0.02 X10(3)/MCL (ref 0–0.04)
IMM GRANULOCYTES NFR BLD AUTO: 0.2 %
KETONES UR QL STRIP: NEGATIVE
LEUKOCYTE ESTERASE UR QL STRIP: NEGATIVE
LIPASE SERPL-CCNC: 12 U/L
LYMPHOCYTES # BLD AUTO: 1.63 X10(3)/MCL (ref 0.6–4.6)
LYMPHOCYTES NFR BLD AUTO: 19.5 %
MCH RBC QN AUTO: 32.7 PG (ref 27–31)
MCHC RBC AUTO-ENTMCNC: 34.7 G/DL (ref 33–36)
MCV RBC AUTO: 94.4 FL (ref 80–94)
MONOCYTES # BLD AUTO: 0.44 X10(3)/MCL (ref 0.1–1.3)
MONOCYTES NFR BLD AUTO: 5.3 %
MUCOUS THREADS URNS QL MICRO: ABNORMAL /LPF
NEUTROPHILS # BLD AUTO: 6.18 X10(3)/MCL (ref 2.1–9.2)
NEUTROPHILS NFR BLD AUTO: 74.2 %
NITRITE UR QL STRIP: NEGATIVE
NRBC BLD AUTO-RTO: 0 %
PH UR STRIP: 7 [PH]
PLATELET # BLD AUTO: 334 X10(3)/MCL (ref 130–400)
PMV BLD AUTO: 9.3 FL (ref 7.4–10.4)
POTASSIUM SERPL-SCNC: 3.9 MMOL/L (ref 3.5–5.1)
PROT SERPL-MCNC: 8.6 GM/DL (ref 6.4–8.3)
PROT UR QL STRIP: ABNORMAL
RBC # BLD AUTO: 4.28 X10(6)/MCL (ref 4.2–5.4)
RBC #/AREA URNS AUTO: ABNORMAL /HPF
SODIUM SERPL-SCNC: 142 MMOL/L (ref 136–145)
SP GR UR STRIP.AUTO: 1.01 (ref 1–1.03)
SQUAMOUS #/AREA URNS AUTO: ABNORMAL /HPF
UROBILINOGEN UR STRIP-ACNC: 0.2
WBC # BLD AUTO: 8.34 X10(3)/MCL (ref 4.5–11.5)
WBC #/AREA URNS AUTO: ABNORMAL /HPF

## 2024-07-20 PROCEDURE — 85025 COMPLETE CBC W/AUTO DIFF WBC: CPT | Performed by: NURSE PRACTITIONER

## 2024-07-20 PROCEDURE — 83690 ASSAY OF LIPASE: CPT | Performed by: NURSE PRACTITIONER

## 2024-07-20 PROCEDURE — 25000003 PHARM REV CODE 250: Performed by: NURSE PRACTITIONER

## 2024-07-20 PROCEDURE — 81025 URINE PREGNANCY TEST: CPT | Performed by: NURSE PRACTITIONER

## 2024-07-20 PROCEDURE — 96374 THER/PROPH/DIAG INJ IV PUSH: CPT

## 2024-07-20 PROCEDURE — 80053 COMPREHEN METABOLIC PANEL: CPT | Performed by: NURSE PRACTITIONER

## 2024-07-20 PROCEDURE — 96361 HYDRATE IV INFUSION ADD-ON: CPT

## 2024-07-20 PROCEDURE — 81003 URINALYSIS AUTO W/O SCOPE: CPT | Performed by: NURSE PRACTITIONER

## 2024-07-20 PROCEDURE — 99284 EMERGENCY DEPT VISIT MOD MDM: CPT | Mod: 25

## 2024-07-20 PROCEDURE — 63600175 PHARM REV CODE 636 W HCPCS: Performed by: NURSE PRACTITIONER

## 2024-07-20 RX ORDER — ONDANSETRON 4 MG/1
4 TABLET, ORALLY DISINTEGRATING ORAL EVERY 6 HOURS PRN
Qty: 20 TABLET | Refills: 0 | Status: SHIPPED | OUTPATIENT
Start: 2024-07-20 | End: 2024-07-25

## 2024-07-20 RX ORDER — ONDANSETRON HYDROCHLORIDE 2 MG/ML
4 INJECTION, SOLUTION INTRAVENOUS
Status: COMPLETED | OUTPATIENT
Start: 2024-07-20 | End: 2024-07-20

## 2024-07-20 RX ADMIN — SODIUM CHLORIDE 1000 ML: 9 INJECTION, SOLUTION INTRAVENOUS at 01:07

## 2024-07-20 RX ADMIN — ONDANSETRON 4 MG: 2 INJECTION INTRAMUSCULAR; INTRAVENOUS at 01:07

## 2024-07-20 NOTE — DISCHARGE INSTRUCTIONS
Zofran for nausea/vomiting (under tongue) as needed. Hydrate with fluids. Go slow with food and advance as tolerated.

## 2024-07-20 NOTE — Clinical Note
"Bebe Batresa" Dimitri was seen and treated in our emergency department on 7/20/2024.  She may return to work on 07/21/2024.       If you have any questions or concerns, please don't hesitate to call.      JANICE OSORIO    "

## 2024-07-20 NOTE — ED PROVIDER NOTES
Encounter Date: 2024       History     Chief Complaint   Patient presents with    Emesis     C/o Vomiting with weakness Headaches,Diarrhea,. LMP 2024. Drinking last night ETOH     See MDM    The history is provided by the patient. No  was used.     Review of patient's allergies indicates:   Allergen Reactions    Pecan nut Anaphylaxis    Fish containing products Rash     No past medical history on file.  Past Surgical History:   Procedure Laterality Date     SECTION      TONSILLECTOMY       Family History   Problem Relation Name Age of Onset    Hypertension Father      Diabetes Father       Social History     Tobacco Use    Smoking status: Never    Smokeless tobacco: Never   Substance Use Topics    Alcohol use: Yes     Comment: occassionally    Drug use: Never     Review of Systems   Gastrointestinal:  Positive for abdominal pain, diarrhea, nausea and vomiting.   Neurological:  Positive for weakness.   All other systems reviewed and are negative.      Physical Exam     Initial Vitals [24 1321]   BP Pulse Resp Temp SpO2   120/85 84 18 98.6 °F (37 °C) 99 %      MAP       --         Physical Exam    Nursing note and vitals reviewed.  Constitutional: She appears well-developed and well-nourished.   Eyes: Conjunctivae are normal.   Cardiovascular:  Normal rate, regular rhythm, normal heart sounds and intact distal pulses.           Pulmonary/Chest: Breath sounds normal. No respiratory distress.   Abdominal: Abdomen is soft. Bowel sounds are normal. She exhibits no distension. There is no abdominal tenderness.   obese   Musculoskeletal:         General: Normal range of motion.     Neurological: She is alert and oriented to person, place, and time. She has normal strength.   Skin: Skin is warm and dry.   Psychiatric: She has a normal mood and affect.         ED Course   Procedures  Labs Reviewed   CBC WITH DIFFERENTIAL - Abnormal       Result Value    WBC 8.34      RBC 4.28       Hgb 14.0      Hct 40.4      MCV 94.4 (*)     MCH 32.7 (*)     MCHC 34.7      RDW 13.5      Platelet 334      MPV 9.3      Neut % 74.2      Lymph % 19.5      Mono % 5.3      Eos % 0.2      Basophil % 0.6      Lymph # 1.63      Neut # 6.18      Mono # 0.44      Eos # 0.02      Baso # 0.05      IG# 0.02      IG% 0.2      NRBC% 0.0     COMPREHENSIVE METABOLIC PANEL - Abnormal    Sodium 142      Potassium 3.9      Chloride 108 (*)     CO2 24      Glucose 96      Blood Urea Nitrogen 12.3      Creatinine 0.85      Calcium 9.6      Protein Total 8.6 (*)     Albumin 4.0      Globulin 4.6 (*)     Albumin/Globulin Ratio 0.9 (*)     Bilirubin Total 0.4      ALP 61      ALT 19      AST 22      eGFR >60      Anion Gap 10.0      BUN/Creatinine Ratio 14     URINALYSIS, REFLEX TO URINE CULTURE - Abnormal    Color, UA Yellow      Appearance, UA Hazy (*)     Specific Gravity, UA 1.015      pH, UA 7.0      Protein, UA Trace (*)     Glucose, UA Negative      Ketones, UA Negative      Blood, UA Negative      Bilirubin, UA Negative      Urobilinogen, UA 0.2      Nitrites, UA Negative      Leukocyte Esterase, UA Negative     URINALYSIS, MICROSCOPIC - Abnormal    Bacteria, UA Few (*)     Mucous, UA Occ (*)     RBC, UA None Seen      WBC, UA 0-2      Squamous Epithelial Cells, UA Moderate (*)    PREGNANCY TEST, URINE RAPID - Normal    hCG Qualitative, Urine Negative     LIPASE - Normal    Lipase Level 12            Imaging Results    None          Medications   sodium chloride 0.9% bolus 1,000 mL 1,000 mL (1,000 mLs Intravenous New Bag 7/20/24 1350)   ondansetron injection 4 mg (4 mg Intravenous Given 7/20/24 1350)     Medical Decision Making  23 y/o female presents with abdominal pains/cramping with n/v/d since this AM and feeling weak. Unable to hold anything down. No urinary complaints. She was drinking excessively last night and concerned that is what is causing her to have these symptoms. Lmp 6/23/24    Labs unremarkable for acute  findings. UA no infection (mod squamous). She is feeling improved with antiemetics and fluids. Will plan to discharge with nausea meds.     Amount and/or Complexity of Data Reviewed  Labs: ordered. Decision-making details documented in ED Course.    Risk  Prescription drug management.      Additional MDM:   Differential Diagnosis:   Other: The following diagnoses were also considered and will be evaluated: dehydration, pancreatitis and UTI.                                   Clinical Impression:  Final diagnoses:  [R11.2, R19.7] Nausea vomiting and diarrhea (Primary)  [E86.0] Dehydration  [F10.10] Excessive drinking of alcohol          ED Disposition Condition    Discharge Stable          ED Prescriptions       Medication Sig Dispense Start Date End Date Auth. Provider    ondansetron (ZOFRAN-ODT) 4 MG TbDL Take 1 tablet (4 mg total) by mouth every 6 (six) hours as needed (nausea/vomiting). 20 tablet 7/20/2024 7/25/2024 Merly Lawrence FNP          Follow-up Information       Follow up With Specialties Details Why Contact Info    primary care provider  Call in 1 week As needed [phone]             Merly Lawrence FNP  07/20/24 2939

## 2024-07-27 ENCOUNTER — HOSPITAL ENCOUNTER (EMERGENCY)
Facility: HOSPITAL | Age: 24
Discharge: HOME | End: 2024-07-27
Attending: EMERGENCY MEDICINE

## 2024-07-27 VITALS
HEIGHT: 61 IN | SYSTOLIC BLOOD PRESSURE: 125 MMHG | BODY MASS INDEX: 47.95 KG/M2 | HEART RATE: 72 BPM | TEMPERATURE: 97 F | OXYGEN SATURATION: 99 % | DIASTOLIC BLOOD PRESSURE: 85 MMHG | RESPIRATION RATE: 20 BRPM | WEIGHT: 254 LBS

## 2024-07-27 DIAGNOSIS — R30.0 DYSURIA: Primary | ICD-10-CM

## 2024-07-27 LAB
B-HCG UR QL: NEGATIVE
BACTERIA #/AREA URNS AUTO: ABNORMAL /HPF
BILIRUB UR QL STRIP.AUTO: NEGATIVE
CLARITY UR: CLEAR
COLOR UR AUTO: YELLOW
GLUCOSE UR QL STRIP: NEGATIVE
HGB UR QL STRIP: NEGATIVE
KETONES UR QL STRIP: NEGATIVE
LEUKOCYTE ESTERASE UR QL STRIP: NEGATIVE
MUCOUS THREADS URNS QL MICRO: ABNORMAL /LPF
NITRITE UR QL STRIP: NEGATIVE
PH UR STRIP: 6 [PH]
PROT UR QL STRIP: ABNORMAL
RBC #/AREA URNS AUTO: ABNORMAL /HPF
SP GR UR STRIP.AUTO: 1.03 (ref 1–1.03)
SQUAMOUS #/AREA URNS AUTO: ABNORMAL /HPF
UROBILINOGEN UR STRIP-ACNC: 0.2
WBC #/AREA URNS AUTO: ABNORMAL /HPF

## 2024-07-27 PROCEDURE — 81025 URINE PREGNANCY TEST: CPT | Performed by: PHYSICIAN ASSISTANT

## 2024-07-27 PROCEDURE — 81003 URINALYSIS AUTO W/O SCOPE: CPT | Performed by: PHYSICIAN ASSISTANT

## 2024-07-27 PROCEDURE — 99284 EMERGENCY DEPT VISIT MOD MDM: CPT

## 2024-07-27 PROCEDURE — 81001 URINALYSIS AUTO W/SCOPE: CPT | Performed by: PHYSICIAN ASSISTANT

## 2024-07-27 RX ORDER — SULFAMETHOXAZOLE AND TRIMETHOPRIM 800; 160 MG/1; MG/1
1 TABLET ORAL 2 TIMES DAILY
Qty: 14 TABLET | Refills: 0 | Status: SHIPPED | OUTPATIENT
Start: 2024-07-27 | End: 2024-08-03

## 2024-07-27 RX ORDER — PHENAZOPYRIDINE HYDROCHLORIDE 200 MG/1
200 TABLET, FILM COATED ORAL
Qty: 6 TABLET | Refills: 0 | Status: SHIPPED | OUTPATIENT
Start: 2024-07-27 | End: 2024-07-29

## 2024-08-05 ENCOUNTER — HOSPITAL ENCOUNTER (EMERGENCY)
Facility: HOSPITAL | Age: 24
Discharge: HOME | End: 2024-08-05
Attending: EMERGENCY MEDICINE

## 2024-08-05 VITALS
WEIGHT: 250 LBS | RESPIRATION RATE: 18 BRPM | HEIGHT: 61 IN | DIASTOLIC BLOOD PRESSURE: 67 MMHG | SYSTOLIC BLOOD PRESSURE: 116 MMHG | TEMPERATURE: 99 F | BODY MASS INDEX: 47.2 KG/M2 | OXYGEN SATURATION: 100 % | HEART RATE: 64 BPM

## 2024-08-05 DIAGNOSIS — F41.9 ANXIETY: ICD-10-CM

## 2024-08-05 DIAGNOSIS — R11.0 NAUSEA: ICD-10-CM

## 2024-08-05 DIAGNOSIS — E66.01 MORBID OBESITY: ICD-10-CM

## 2024-08-05 DIAGNOSIS — R53.81 MALAISE: Primary | ICD-10-CM

## 2024-08-05 LAB
ALBUMIN SERPL-MCNC: 3.7 G/DL (ref 3.5–5)
ALBUMIN/GLOB SERPL: 1 RATIO (ref 1.1–2)
ALP SERPL-CCNC: 61 UNIT/L (ref 40–150)
ALT SERPL-CCNC: 14 UNIT/L (ref 0–55)
ANION GAP SERPL CALC-SCNC: 8 MEQ/L
AST SERPL-CCNC: 22 UNIT/L (ref 5–34)
B-HCG UR QL: NEGATIVE
BACTERIA #/AREA URNS AUTO: ABNORMAL /HPF
BASOPHILS # BLD AUTO: 0.05 X10(3)/MCL
BASOPHILS NFR BLD AUTO: 0.5 %
BILIRUB SERPL-MCNC: 0.3 MG/DL
BILIRUB UR QL STRIP.AUTO: NEGATIVE
BUN SERPL-MCNC: 12.3 MG/DL (ref 7–18.7)
CALCIUM SERPL-MCNC: 9 MG/DL (ref 8.4–10.2)
CHLORIDE SERPL-SCNC: 105 MMOL/L (ref 98–107)
CLARITY UR: CLEAR
CO2 SERPL-SCNC: 23 MMOL/L (ref 22–29)
COLOR UR AUTO: ABNORMAL
CREAT SERPL-MCNC: 0.93 MG/DL (ref 0.55–1.02)
CREAT/UREA NIT SERPL: 13
EOSINOPHIL # BLD AUTO: 0.07 X10(3)/MCL (ref 0–0.9)
EOSINOPHIL NFR BLD AUTO: 0.8 %
ERYTHROCYTE [DISTWIDTH] IN BLOOD BY AUTOMATED COUNT: 12.8 % (ref 11.5–17)
FLUAV AG UPPER RESP QL IA.RAPID: NOT DETECTED
FLUBV AG UPPER RESP QL IA.RAPID: NOT DETECTED
GFR SERPLBLD CREATININE-BSD FMLA CKD-EPI: >60 ML/MIN/1.73/M2
GLOBULIN SER-MCNC: 3.8 GM/DL (ref 2.4–3.5)
GLUCOSE SERPL-MCNC: 87 MG/DL (ref 74–100)
GLUCOSE UR QL STRIP: NEGATIVE
HCT VFR BLD AUTO: 36.9 % (ref 37–47)
HGB BLD-MCNC: 13 G/DL (ref 12–16)
HGB UR QL STRIP: ABNORMAL
IMM GRANULOCYTES # BLD AUTO: 0.02 X10(3)/MCL (ref 0–0.04)
IMM GRANULOCYTES NFR BLD AUTO: 0.2 %
KETONES UR QL STRIP: NEGATIVE
LEUKOCYTE ESTERASE UR QL STRIP: NEGATIVE
LIPASE SERPL-CCNC: 18 U/L
LYMPHOCYTES # BLD AUTO: 2.51 X10(3)/MCL (ref 0.6–4.6)
LYMPHOCYTES NFR BLD AUTO: 27 %
MAGNESIUM SERPL-MCNC: 2 MG/DL (ref 1.6–2.6)
MCH RBC QN AUTO: 32.7 PG (ref 27–31)
MCHC RBC AUTO-ENTMCNC: 35.2 G/DL (ref 33–36)
MCV RBC AUTO: 92.9 FL (ref 80–94)
MONOCYTES # BLD AUTO: 0.56 X10(3)/MCL (ref 0.1–1.3)
MONOCYTES NFR BLD AUTO: 6 %
NEUTROPHILS # BLD AUTO: 6.1 X10(3)/MCL (ref 2.1–9.2)
NEUTROPHILS NFR BLD AUTO: 65.5 %
NITRITE UR QL STRIP: NEGATIVE
NRBC BLD AUTO-RTO: 0 %
PH UR STRIP: 7 [PH]
PLATELET # BLD AUTO: 357 X10(3)/MCL (ref 130–400)
PMV BLD AUTO: 9.7 FL (ref 7.4–10.4)
POTASSIUM SERPL-SCNC: 4.1 MMOL/L (ref 3.5–5.1)
PROT SERPL-MCNC: 7.5 GM/DL (ref 6.4–8.3)
PROT UR QL STRIP: NEGATIVE
RBC # BLD AUTO: 3.97 X10(6)/MCL (ref 4.2–5.4)
RBC #/AREA URNS AUTO: ABNORMAL /HPF
SARS-COV-2 RNA RESP QL NAA+PROBE: NOT DETECTED
SODIUM SERPL-SCNC: 136 MMOL/L (ref 136–145)
SP GR UR STRIP.AUTO: 1.01 (ref 1–1.03)
SQUAMOUS #/AREA URNS AUTO: ABNORMAL /HPF
TROPONIN I SERPL-MCNC: <0.01 NG/ML (ref 0–0.04)
TSH SERPL-ACNC: 1.46 UIU/ML (ref 0.35–4.94)
UROBILINOGEN UR STRIP-ACNC: 0.2
WBC # BLD AUTO: 9.31 X10(3)/MCL (ref 4.5–11.5)
WBC #/AREA URNS AUTO: ABNORMAL /HPF

## 2024-08-05 PROCEDURE — 85025 COMPLETE CBC W/AUTO DIFF WBC: CPT | Performed by: EMERGENCY MEDICINE

## 2024-08-05 PROCEDURE — 0240U COVID/FLU A&B PCR: CPT | Performed by: EMERGENCY MEDICINE

## 2024-08-05 PROCEDURE — 84484 ASSAY OF TROPONIN QUANT: CPT | Performed by: EMERGENCY MEDICINE

## 2024-08-05 PROCEDURE — 81001 URINALYSIS AUTO W/SCOPE: CPT | Performed by: EMERGENCY MEDICINE

## 2024-08-05 PROCEDURE — 81025 URINE PREGNANCY TEST: CPT | Performed by: EMERGENCY MEDICINE

## 2024-08-05 PROCEDURE — 84443 ASSAY THYROID STIM HORMONE: CPT | Performed by: EMERGENCY MEDICINE

## 2024-08-05 PROCEDURE — 63600175 PHARM REV CODE 636 W HCPCS: Performed by: EMERGENCY MEDICINE

## 2024-08-05 PROCEDURE — 83735 ASSAY OF MAGNESIUM: CPT | Performed by: EMERGENCY MEDICINE

## 2024-08-05 PROCEDURE — 83690 ASSAY OF LIPASE: CPT | Performed by: EMERGENCY MEDICINE

## 2024-08-05 PROCEDURE — 80053 COMPREHEN METABOLIC PANEL: CPT | Performed by: EMERGENCY MEDICINE

## 2024-08-05 PROCEDURE — 99285 EMERGENCY DEPT VISIT HI MDM: CPT | Mod: 25

## 2024-08-05 RX ORDER — ONDANSETRON 8 MG/1
8 TABLET, ORALLY DISINTEGRATING ORAL EVERY 6 HOURS PRN
Qty: 20 TABLET | Refills: 0 | Status: SHIPPED | OUTPATIENT
Start: 2024-08-05 | End: 2024-08-10

## 2024-08-05 RX ADMIN — SODIUM CHLORIDE, POTASSIUM CHLORIDE, SODIUM LACTATE AND CALCIUM CHLORIDE 1000 ML: 600; 310; 30; 20 INJECTION, SOLUTION INTRAVENOUS at 07:08

## 2024-08-06 NOTE — ED PROVIDER NOTES
"Encounter Date: 2024       History     Chief Complaint   Patient presents with    Shortness of Breath     The history is provided by the patient.   Shortness of Breath  This is a new problem. The average episode lasts 1 day. The current episode started 2 days ago. Associated symptoms include cough. Pertinent negatives include no fever, no sore throat, no chest pain, no vomiting and no rash. Associated symptoms comments: Body aches, nausea.   States, "I don't know what it is but I just don't fee like me"    Review of patient's allergies indicates:   Allergen Reactions    Pecan nut Anaphylaxis    Fish containing products Rash     No past medical history on file.  Past Surgical History:   Procedure Laterality Date     SECTION      TONSILLECTOMY       Family History   Problem Relation Name Age of Onset    Hypertension Father      Diabetes Father       Social History     Tobacco Use    Smoking status: Never    Smokeless tobacco: Never   Substance Use Topics    Alcohol use: Yes     Comment: occassionally    Drug use: Never     Review of Systems   Constitutional:  Negative for fever.   HENT:  Negative for sore throat.    Respiratory:  Positive for cough and shortness of breath.    Cardiovascular:  Negative for chest pain.   Gastrointestinal:  Positive for nausea. Negative for diarrhea and vomiting.   Genitourinary:  Negative for dysuria.   Musculoskeletal:  Positive for arthralgias and myalgias. Negative for back pain.   Skin:  Negative for rash.   Neurological:  Negative for weakness.   Hematological:  Does not bruise/bleed easily.       Physical Exam     Initial Vitals [246]   BP Pulse Resp Temp SpO2   116/67 64 18 98.5 °F (36.9 °C) 100 %      MAP       --         Physical Exam    Nursing note and vitals reviewed.  Constitutional: She appears well-developed and well-nourished.   HENT:   Head: Normocephalic and atraumatic.   Right Ear: External ear normal.   Left Ear: External ear normal.   Eyes: " Conjunctivae and EOM are normal. Pupils are equal, round, and reactive to light.   Neck: Neck supple.   Normal range of motion.  Cardiovascular:  Normal rate, regular rhythm, normal heart sounds and intact distal pulses.           Pulmonary/Chest: Breath sounds normal.   Abdominal: Abdomen is soft. Bowel sounds are normal.   Morbidly obese   Musculoskeletal:         General: Normal range of motion.      Cervical back: Normal range of motion and neck supple.     Neurological: She is alert and oriented to person, place, and time. GCS score is 15. GCS eye subscore is 4. GCS verbal subscore is 5. GCS motor subscore is 6.   Skin: Skin is warm and dry. Capillary refill takes less than 2 seconds.   Psychiatric: She has a normal mood and affect. Her behavior is normal. Judgment and thought content normal.         ED Course   Procedures  Labs Reviewed   COMPREHENSIVE METABOLIC PANEL - Abnormal       Result Value    Sodium 136      Potassium 4.1      Chloride 105      CO2 23      Glucose 87      Blood Urea Nitrogen 12.3      Creatinine 0.93      Calcium 9.0      Protein Total 7.5      Albumin 3.7      Globulin 3.8 (*)     Albumin/Globulin Ratio 1.0 (*)     Bilirubin Total 0.3      ALP 61      ALT 14      AST 22      eGFR >60      Anion Gap 8.0      BUN/Creatinine Ratio 13     URINALYSIS, REFLEX TO URINE CULTURE - Abnormal    Color, UA Light-Yellow      Appearance, UA Clear      Specific Gravity, UA 1.010      pH, UA 7.0      Protein, UA Negative      Glucose, UA Negative      Ketones, UA Negative      Blood, UA Small (*)     Bilirubin, UA Negative      Urobilinogen, UA 0.2      Nitrites, UA Negative      Leukocyte Esterase, UA Negative     CBC WITH DIFFERENTIAL - Abnormal    WBC 9.31      RBC 3.97 (*)     Hgb 13.0      Hct 36.9 (*)     MCV 92.9      MCH 32.7 (*)     MCHC 35.2      RDW 12.8      Platelet 357      MPV 9.7      Neut % 65.5      Lymph % 27.0      Mono % 6.0      Eos % 0.8      Basophil % 0.5      Lymph # 2.51       Neut # 6.10      Mono # 0.56      Eos # 0.07      Baso # 0.05      IG# 0.02      IG% 0.2      NRBC% 0.0     URINALYSIS, MICROSCOPIC - Abnormal    Bacteria, UA Rare      RBC, UA 0-2      WBC, UA 0-2      Squamous Epithelial Cells, UA Few (*)     Narrative:     Urine microscopic results may be inaccurate, <12 cc sample submitted   COVID/FLU A&B PCR - Normal    Influenza A PCR Not Detected      Influenza B PCR Not Detected      SARS-CoV-2 PCR Not Detected      Narrative:     The Xpert Xpress SARS-CoV-2/FLU/RSV plus is a rapid, multiplexed real-time PCR test intended for the simultaneous qualitative detection and differentiation of SARS-CoV-2, Influenza A, Influenza B, and respiratory syncytial virus (RSV) viral RNA in either nasopharyngeal swab or nasal swab specimens.         MAGNESIUM - Normal    Magnesium Level 2.00     TROPONIN I - Normal    Troponin-I <0.010     LIPASE - Normal    Lipase Level 18     PREGNANCY TEST, URINE RAPID - Normal    hCG Qualitative, Urine Negative     TSH - Normal    TSH 1.460     CBC W/ AUTO DIFFERENTIAL    Narrative:     The following orders were created for panel order CBC auto differential.  Procedure                               Abnormality         Status                     ---------                               -----------         ------                     CBC with Differential[[id number]]       Abnormal            Final result                 Please view results for these tests on the individual orders.     EKG Readings: (Independently Interpreted)   Initial Reading: No STEMI. Rhythm: Normal Sinus Rhythm. Heart Rate: 67. Ectopy: No Ectopy. Conduction: Normal. ST Segments: Normal ST Segments. T Waves: Normal. Axis: Normal. Clinical Impression: Normal Sinus Rhythm       Imaging Results              X-Ray Chest AP Portable (Final result)  Result time 08/05/24 20:13:01      Final result by Davon Weeks MD (08/05/24 20:13:01)                   Narrative:    EXAMINATION  XR  CHEST AP PORTABLE    CLINICAL HISTORY  SOB;    TECHNIQUE  A total of 1 frontal image(s) of the chest.    COMPARISON  11 October 2020    FINDINGS  Lines/tubes/devices: none present    The cardiomediastinal silhouette and central pulmonary vasculature are unremarkable for utilized technique.  The trachea is midline. There is no lobar consolidation, pleural effusion, or pneumothorax.    There is no acute osseous or extrathoracic abnormality.    IMPRESSION  No convincing acute radiographic abnormality.      Electronically signed by: Davon Weeks  Date:    08/05/2024  Time:    20:13                                     Medications   lactated ringers bolus 1,000 mL (1,000 mLs Intravenous New Bag 8/5/24 1944)     Medical Decision Making  Amount and/or Complexity of Data Reviewed  Labs: ordered. Decision-making details documented in ED Course.  Radiology: ordered.  ECG/medicine tests: ordered and independent interpretation performed. Decision-making details documented in ED Course.    Differential includes:  anxiety, COVID, URI, bronchitis, viral illness, anemia, electrolyte disturbance, thyroid disease, infectious process, dehydration.  Will obtain CBC, CMP, COVID/flu, lipase, troponin, UA, UPT, mag, EKG and CXR.  She has 7 ED visits in the past 4 months for various somatic complaints.  Will refer to [facility] primary care upon discharge.                                  Clinical Impression:  Final diagnoses:  [R53.81] Malaise (Primary)  [R11.0] Nausea  [E66.01] Morbid obesity  [F41.9] Anxiety          ED Disposition Condition    Discharge Stable          ED Prescriptions       Medication Sig Dispense Start Date End Date Auth. Provider    ondansetron (ZOFRAN-ODT) 8 MG TbDL Take 1 tablet (8 mg total) by mouth every 6 (six) hours as needed (nausea). 20 tablet 8/5/2024 8/10/2024 Camilo Clayton MD          Follow-up Information       Follow up With Specialties Details Why Contact Info    Call [phone] to establish  primary care with [facility]                 Camilo Clayton MD  08/05/24 3890

## 2024-08-08 LAB
OHS QRS DURATION: 76 MS
OHS QTC CALCULATION: 435 MS

## 2024-09-04 ENCOUNTER — HOSPITAL ENCOUNTER (EMERGENCY)
Facility: HOSPITAL | Age: 24
Discharge: HOME | End: 2024-09-04
Attending: EMERGENCY MEDICINE

## 2024-09-04 VITALS
HEIGHT: 61 IN | WEIGHT: 250 LBS | RESPIRATION RATE: 18 BRPM | SYSTOLIC BLOOD PRESSURE: 107 MMHG | DIASTOLIC BLOOD PRESSURE: 62 MMHG | HEART RATE: 60 BPM | OXYGEN SATURATION: 100 % | BODY MASS INDEX: 47.2 KG/M2 | TEMPERATURE: 99 F

## 2024-09-04 DIAGNOSIS — R10.13 EPIGASTRIC ABDOMINAL PAIN: Primary | ICD-10-CM

## 2024-09-04 LAB
ALBUMIN SERPL-MCNC: 3.3 G/DL (ref 3.5–5)
ALBUMIN/GLOB SERPL: 0.9 RATIO (ref 1.1–2)
ALP SERPL-CCNC: 59 UNIT/L (ref 40–150)
ALT SERPL-CCNC: 12 UNIT/L (ref 0–55)
ANION GAP SERPL CALC-SCNC: 8 MEQ/L
AST SERPL-CCNC: 18 UNIT/L (ref 5–34)
B-HCG UR QL: NEGATIVE
BASOPHILS # BLD AUTO: 0.06 X10(3)/MCL
BASOPHILS NFR BLD AUTO: 0.8 %
BILIRUB SERPL-MCNC: 0.3 MG/DL
BUN SERPL-MCNC: 14.5 MG/DL (ref 7–18.7)
CALCIUM SERPL-MCNC: 8.7 MG/DL (ref 8.4–10.2)
CHLORIDE SERPL-SCNC: 106 MMOL/L (ref 98–107)
CO2 SERPL-SCNC: 26 MMOL/L (ref 22–29)
CREAT SERPL-MCNC: 0.81 MG/DL (ref 0.55–1.02)
CREAT/UREA NIT SERPL: 18
EOSINOPHIL # BLD AUTO: 0.12 X10(3)/MCL (ref 0–0.9)
EOSINOPHIL NFR BLD AUTO: 1.5 %
ERYTHROCYTE [DISTWIDTH] IN BLOOD BY AUTOMATED COUNT: 12.9 % (ref 11.5–17)
GFR SERPLBLD CREATININE-BSD FMLA CKD-EPI: >60 ML/MIN/1.73/M2
GLOBULIN SER-MCNC: 3.8 GM/DL (ref 2.4–3.5)
GLUCOSE SERPL-MCNC: 90 MG/DL (ref 74–100)
HCT VFR BLD AUTO: 35.8 % (ref 37–47)
HGB BLD-MCNC: 12.2 G/DL (ref 12–16)
IMM GRANULOCYTES # BLD AUTO: 0.02 X10(3)/MCL (ref 0–0.04)
IMM GRANULOCYTES NFR BLD AUTO: 0.3 %
LIPASE SERPL-CCNC: 13 U/L
LYMPHOCYTES # BLD AUTO: 3.06 X10(3)/MCL (ref 0.6–4.6)
LYMPHOCYTES NFR BLD AUTO: 39 %
MCH RBC QN AUTO: 32.3 PG (ref 27–31)
MCHC RBC AUTO-ENTMCNC: 34.1 G/DL (ref 33–36)
MCV RBC AUTO: 94.7 FL (ref 80–94)
MONOCYTES # BLD AUTO: 0.61 X10(3)/MCL (ref 0.1–1.3)
MONOCYTES NFR BLD AUTO: 7.8 %
NEUTROPHILS # BLD AUTO: 3.97 X10(3)/MCL (ref 2.1–9.2)
NEUTROPHILS NFR BLD AUTO: 50.6 %
NRBC BLD AUTO-RTO: 0 %
PLATELET # BLD AUTO: 334 X10(3)/MCL (ref 130–400)
PMV BLD AUTO: 9.6 FL (ref 7.4–10.4)
POTASSIUM SERPL-SCNC: 3.7 MMOL/L (ref 3.5–5.1)
PROT SERPL-MCNC: 7.1 GM/DL (ref 6.4–8.3)
RBC # BLD AUTO: 3.78 X10(6)/MCL (ref 4.2–5.4)
SODIUM SERPL-SCNC: 140 MMOL/L (ref 136–145)
WBC # BLD AUTO: 7.84 X10(3)/MCL (ref 4.5–11.5)

## 2024-09-04 PROCEDURE — 85025 COMPLETE CBC W/AUTO DIFF WBC: CPT | Performed by: EMERGENCY MEDICINE

## 2024-09-04 PROCEDURE — 80053 COMPREHEN METABOLIC PANEL: CPT | Performed by: EMERGENCY MEDICINE

## 2024-09-04 PROCEDURE — 99284 EMERGENCY DEPT VISIT MOD MDM: CPT | Mod: 25

## 2024-09-04 PROCEDURE — 25000003 PHARM REV CODE 250: Performed by: EMERGENCY MEDICINE

## 2024-09-04 PROCEDURE — 83690 ASSAY OF LIPASE: CPT | Performed by: EMERGENCY MEDICINE

## 2024-09-04 PROCEDURE — 81025 URINE PREGNANCY TEST: CPT | Performed by: EMERGENCY MEDICINE

## 2024-09-04 RX ORDER — LIDOCAINE HYDROCHLORIDE 20 MG/ML
15 SOLUTION OROPHARYNGEAL ONCE
Status: COMPLETED | OUTPATIENT
Start: 2024-09-04 | End: 2024-09-04

## 2024-09-04 RX ORDER — ALUMINUM HYDROXIDE, MAGNESIUM HYDROXIDE, AND SIMETHICONE 1200; 120; 1200 MG/30ML; MG/30ML; MG/30ML
30 SUSPENSION ORAL ONCE
Status: COMPLETED | OUTPATIENT
Start: 2024-09-04 | End: 2024-09-04

## 2024-09-04 RX ORDER — FAMOTIDINE 20 MG/1
20 TABLET, FILM COATED ORAL 2 TIMES DAILY
Qty: 20 TABLET | Refills: 0 | Status: SHIPPED | OUTPATIENT
Start: 2024-09-04 | End: 2025-09-04

## 2024-09-04 RX ADMIN — ALUMINUM HYDROXIDE, MAGNESIUM HYDROXIDE, AND SIMETHICONE 30 ML: 1200; 120; 1200 SUSPENSION ORAL at 10:09

## 2024-09-04 RX ADMIN — LIDOCAINE HYDROCHLORIDE 15 ML: 20 SOLUTION ORAL at 10:09

## 2024-09-05 NOTE — ED PROVIDER NOTES
Encounter Date: 2024       History     Chief Complaint   Patient presents with    Abdominal Pain    Nausea     24-year-old no past medical history presents with recurrent epigastric abdominal pain described as cramping since yesterday.  Pain worse with eating.  +nausea but no vomiting.  Last bowel movement was this morning.  No blood or melena.  Patient says she did have some diarrhea after taking a laxative.  She is also having some heartburn.  Patient has been seen for this in the past..  She says her pain has gotten better previously by taking Linzess from her sister    The history is provided by the patient.     Review of patient's allergies indicates:   Allergen Reactions    Pecan nut Anaphylaxis    Fish containing products Rash     No past medical history on file.  Past Surgical History:   Procedure Laterality Date     SECTION      TONSILLECTOMY       Family History   Problem Relation Name Age of Onset    Hypertension Father      Diabetes Father       Social History     Tobacco Use    Smoking status: Never    Smokeless tobacco: Never   Substance Use Topics    Alcohol use: Yes     Comment: occassionally    Drug use: Never     Review of Systems   Constitutional:  Negative for chills, diaphoresis, fatigue and fever.   HENT:  Negative for congestion, drooling, ear discharge, ear pain, postnasal drip, rhinorrhea, sinus pressure, sinus pain, sore throat and tinnitus.    Eyes:  Negative for discharge.   Respiratory:  Negative for cough, chest tightness, shortness of breath and wheezing.    Cardiovascular:  Negative for chest pain and palpitations.   Gastrointestinal:  Positive for abdominal pain and nausea. Negative for blood in stool, constipation, diarrhea, rectal pain and vomiting.   Genitourinary:  Negative for frequency, hematuria and urgency.   Musculoskeletal:  Negative for back pain, neck pain and neck stiffness.   Skin:  Negative for rash.   Neurological:  Negative for syncope, weakness,  light-headedness, numbness and headaches.   Psychiatric/Behavioral:  Negative for suicidal ideas.        Physical Exam     Initial Vitals [09/04/24 2119]   BP Pulse Resp Temp SpO2   107/62 60 18 98.6 °F (37 °C) 100 %      MAP       --         Physical Exam    Nursing note and vitals reviewed.  Constitutional: No distress.   HENT:   Mouth/Throat: Oropharynx is clear and moist.   Eyes: Conjunctivae are normal.   Cardiovascular:  Normal rate.           Pulmonary/Chest: Breath sounds normal. No respiratory distress.   Abdominal: Abdomen is soft. There is no abdominal tenderness. There is no guarding.   Musculoskeletal:         General: Normal range of motion.     Neurological: She is alert and oriented to person, place, and time.   Skin: Skin is warm and dry.         ED Course   Procedures  Labs Reviewed   COMPREHENSIVE METABOLIC PANEL - Abnormal       Result Value    Sodium 140      Potassium 3.7      Chloride 106      CO2 26      Glucose 90      Blood Urea Nitrogen 14.5      Creatinine 0.81      Calcium 8.7      Protein Total 7.1      Albumin 3.3 (*)     Globulin 3.8 (*)     Albumin/Globulin Ratio 0.9 (*)     Bilirubin Total 0.3      ALP 59      ALT 12      AST 18      eGFR >60      Anion Gap 8.0      BUN/Creatinine Ratio 18     CBC WITH DIFFERENTIAL - Abnormal    WBC 7.84      RBC 3.78 (*)     Hgb 12.2      Hct 35.8 (*)     MCV 94.7 (*)     MCH 32.3 (*)     MCHC 34.1      RDW 12.9      Platelet 334      MPV 9.6      Neut % 50.6      Lymph % 39.0      Mono % 7.8      Eos % 1.5      Basophil % 0.8      Lymph # 3.06      Neut # 3.97      Mono # 0.61      Eos # 0.12      Baso # 0.06      IG# 0.02      IG% 0.3      NRBC% 0.0     LIPASE - Normal    Lipase Level 13     PREGNANCY TEST, URINE RAPID - Normal    hCG Qualitative, Urine Negative     CBC W/ AUTO DIFFERENTIAL    Narrative:     The following orders were created for panel order CBC auto differential.  Procedure                               Abnormality         Status                      ---------                               -----------         ------                     CBC with Differential[[id number]]       Abnormal            Final result                 Please view results for these tests on the individual orders.          Imaging Results              X-Ray Abdomen Flat And Erect (In process)  Result time 09/04/24 22:35:54                     Medications   aluminum-magnesium hydroxide-simethicone 200-200-20 mg/5 mL suspension 30 mL (30 mLs Oral Given 9/4/24 2206)     And   LIDOcaine viscous HCl 2% oral solution 15 mL (15 mLs Oral Given 9/4/24 2206)     Medical Decision Making  Medical Decision Making  Problem list/ differential diagnosis including but not limited to:  GERD, PUD, gastritis, pancreatitis, gallbladder disease, MI, splenic infarct/enlargement, pericarditis, dissection, hepatitis, pyelonephritis, pneumonia, bowel obstruction    Patient's chronic illnesses impacting care:  none    Diagnostic test considered but not ordered:    My interpretations:      Radiology reports      Discussion of case with external qualified healthcare professionals:  Not applicable    Review of external notes( inpt, ems, NH, clinic):      Decision rules/scores:    Medications reviewed:  Medications ordered in the ER:  GI cocktail  Discharge prescriptions:  Pepcid    Social variables possible impacting patient's healthcare:    Code status/discussion    Shared decision making:    Consideration for admission versus discharge: stable for discharge     Amount and/or Complexity of Data Reviewed  Labs: ordered.  Radiology: ordered.    Risk  OTC drugs.  Prescription drug management.               ED Course as of 09/04/24 2338   Wed Sep 04, 2024   2332 hCG Qualitative, Urine: Negative [WC]   2333 BILIRUBIN TOTAL: 0.3 [WC]   2333 ALP: 59 [WC]   2333 ALT: 12 [WC]   2333 AST: 18 [WC]   2335 Patient says her abdominal pain is better after GI cocktail. [WC]      ED Course User Index  [WC] Anabell  Mahesh HATHAWAY MD                           Clinical Impression:  Final diagnoses:  [R10.13] Epigastric abdominal pain (Primary)          ED Disposition Condition    Discharge Stable          ED Prescriptions       Medication Sig Dispense Start Date End Date Auth. Provider    famotidine (PEPCID) 20 MG tablet Take 1 tablet (20 mg total) by mouth 2 (two) times daily. 20 tablet 9/4/2024 9/4/2025 Mahesh Hung MD          Follow-up Information    None          Mahesh Hung MD  09/04/24 2805

## 2024-09-09 ENCOUNTER — HOSPITAL ENCOUNTER (EMERGENCY)
Facility: HOSPITAL | Age: 24
Discharge: HOME | End: 2024-09-09
Attending: STUDENT IN AN ORGANIZED HEALTH CARE EDUCATION/TRAINING PROGRAM

## 2024-09-09 VITALS
SYSTOLIC BLOOD PRESSURE: 124 MMHG | HEART RATE: 66 BPM | BODY MASS INDEX: 47.2 KG/M2 | OXYGEN SATURATION: 100 % | RESPIRATION RATE: 18 BRPM | HEIGHT: 61 IN | WEIGHT: 250 LBS | DIASTOLIC BLOOD PRESSURE: 90 MMHG | TEMPERATURE: 99 F

## 2024-09-09 DIAGNOSIS — N83.202 LEFT OVARIAN CYST: ICD-10-CM

## 2024-09-09 DIAGNOSIS — R30.0 DYSURIA: Primary | ICD-10-CM

## 2024-09-09 DIAGNOSIS — R10.9 LEFT FLANK PAIN: ICD-10-CM

## 2024-09-09 LAB
B-HCG UR QL: NEGATIVE
BILIRUB UR QL STRIP.AUTO: NEGATIVE
C TRACH DNA SPEC QL NAA+PROBE: NOT DETECTED
CLARITY UR: CLEAR
COLOR UR AUTO: YELLOW
GLUCOSE UR QL STRIP: NEGATIVE
HGB UR QL STRIP: NEGATIVE
KETONES UR QL STRIP: NEGATIVE
LEUKOCYTE ESTERASE UR QL STRIP: NEGATIVE
N GONORRHOEA DNA SPEC QL NAA+PROBE: NOT DETECTED
NITRITE UR QL STRIP: NEGATIVE
PH UR STRIP: 6 [PH]
PROT UR QL STRIP: NEGATIVE
SOURCE (OHS): NORMAL
SP GR UR STRIP.AUTO: >=1.03 (ref 1–1.03)
UROBILINOGEN UR STRIP-ACNC: 0.2

## 2024-09-09 PROCEDURE — 96372 THER/PROPH/DIAG INJ SC/IM: CPT

## 2024-09-09 PROCEDURE — 81003 URINALYSIS AUTO W/O SCOPE: CPT

## 2024-09-09 PROCEDURE — 87591 N.GONORRHOEAE DNA AMP PROB: CPT

## 2024-09-09 PROCEDURE — 99285 EMERGENCY DEPT VISIT HI MDM: CPT | Mod: 25

## 2024-09-09 PROCEDURE — 81025 URINE PREGNANCY TEST: CPT

## 2024-09-09 PROCEDURE — 87491 CHLMYD TRACH DNA AMP PROBE: CPT

## 2024-09-09 PROCEDURE — 63600175 PHARM REV CODE 636 W HCPCS

## 2024-09-09 RX ORDER — KETOROLAC TROMETHAMINE 30 MG/ML
60 INJECTION, SOLUTION INTRAMUSCULAR; INTRAVENOUS
Status: COMPLETED | OUTPATIENT
Start: 2024-09-09 | End: 2024-09-09

## 2024-09-09 RX ORDER — KETOROLAC TROMETHAMINE 10 MG/1
10 TABLET, FILM COATED ORAL EVERY 6 HOURS PRN
Qty: 20 TABLET | Refills: 0 | Status: SHIPPED | OUTPATIENT
Start: 2024-09-09 | End: 2024-09-14

## 2024-09-09 RX ADMIN — KETOROLAC TROMETHAMINE 60 MG: 60 INJECTION, SOLUTION INTRAMUSCULAR at 07:09

## 2024-09-09 NOTE — ED PROVIDER NOTES
Encounter Date: 2024       History     Chief Complaint   Patient presents with    Dysuria     Pt complaint of burinng with urination and pain to left side of lower back     The patient is a 24 y.o. female  who presents to the Emergency Department with a chief complaint of dysuria. Symptoms began 1 week ago and have been constant since onset. Her pain is currently rated as a 4/10 in severity and described as aching with no radiation. Associated symptoms include left lower back pain. Symptoms are aggravated with nothing and there are no alleviating factors. The patient denies abdominal pain, nausea, vomiting, fever, or chills. She reports taking nothing prior to arrival with no relief of symptoms. No other reported symptoms at this time.      The history is provided by the patient. No  was used.   Dysuria   This is a new problem. The current episode started several days ago. The problem occurs every urination. The problem has been unchanged. The quality of the pain is described as burning. The pain is at a severity of 4/10. She is Sexually active. Pertinent negatives include no chills, no sweats, no nausea, no urgency and no flank pain. She has tried nothing for the symptoms.     Review of patient's allergies indicates:   Allergen Reactions    Pecan nut Anaphylaxis    Fish containing products Rash     History reviewed. No pertinent past medical history.  Past Surgical History:   Procedure Laterality Date     SECTION      TONSILLECTOMY       Family History   Problem Relation Name Age of Onset    Hypertension Father      Diabetes Father       Social History     Tobacco Use    Smoking status: Never    Smokeless tobacco: Never   Substance Use Topics    Alcohol use: Yes     Comment: occassionally    Drug use: Never     Review of Systems   Constitutional:  Negative for chills and fever.   HENT:  Negative for sore throat.    Respiratory:  Negative for chest tightness and shortness of breath.     Cardiovascular:  Negative for chest pain.   Gastrointestinal:  Negative for nausea.   Genitourinary:  Positive for dysuria. Negative for flank pain and urgency.   Musculoskeletal:  Positive for back pain.   Skin:  Negative for rash.   Neurological:  Negative for weakness.   Hematological:  Does not bruise/bleed easily.   All other systems reviewed and are negative.      Physical Exam     Initial Vitals [09/09/24 1849]   BP Pulse Resp Temp SpO2   (!) 124/90 66 18 98.6 °F (37 °C) 100 %      MAP       --         Physical Exam    Nursing note and vitals reviewed.  Constitutional: She appears well-developed and well-nourished.   HENT:   Head: Normocephalic.   Right Ear: Hearing and tympanic membrane normal.   Left Ear: Hearing and tympanic membrane normal.   Mouth/Throat: Uvula is midline, oropharynx is clear and moist and mucous membranes are normal.   Eyes: Conjunctivae and EOM are normal. Pupils are equal, round, and reactive to light.   Cardiovascular:  Normal rate, regular rhythm, normal heart sounds and normal pulses.           Pulmonary/Chest: Effort normal and breath sounds normal.   Abdominal: Abdomen is soft. Bowel sounds are normal. There is no abdominal tenderness.   No CVA tenderness   No right CVA tenderness.  No left CVA tenderness.     Lymphadenopathy:     She has no cervical adenopathy.   Neurological: She is alert. GCS eye subscore is 4. GCS verbal subscore is 5. GCS motor subscore is 6.   Skin: Skin is warm, dry and intact. Capillary refill takes less than 2 seconds.         ED Course   Procedures  Labs Reviewed   URINALYSIS, REFLEX TO URINE CULTURE - Normal       Result Value    Color, UA Yellow      Appearance, UA Clear      Specific Gravity, UA >=1.030      pH, UA 6.0      Protein, UA Negative      Glucose, UA Negative      Ketones, UA Negative      Blood, UA Negative      Bilirubin, UA Negative      Urobilinogen, UA 0.2      Nitrites, UA Negative      Leukocyte Esterase, UA Negative     PREGNANCY  TEST, URINE RAPID - Normal    hCG Qualitative, Urine Negative     CHLAMYDIA/GONORRHOEAE(GC), PCR          Imaging Results              CT Abdomen Pelvis  Without Contrast (Final result)  Result time 09/09/24 20:11:49      Final result by Trace Green MD (09/09/24 20:11:49)                   Impression:      No abdominopelvic visceral acute findings identified.      Electronically signed by: Trace Green  Date:    09/09/2024  Time:    20:11               Narrative:    EXAMINATION:  CT ABDOMEN PELVIS WITHOUT CONTRAST    CLINICAL HISTORY:  Flank pain, kidney stone suspected;    TECHNIQUE:  Multidetector axial images were obtained from the  diaphragms to below symphysis pubis without the administration of IV contrast. Oral contrast was not administered.    Dose length product of 763 mGycm. Automated exposure control was utilized to minimize radiation dose.    COMPARISON:  None available    FINDINGS:  Included lungs show right basilar minimal atelectasis without acute air space infiltrates or fluid within the pleural spaces.    Within limitations of noncontrast technique, no acute findings of the liver, pancreas and spleen identified. Gallbladder wall is not thickened and there is no intraluminal calcified calculus. No apparent biliary dilation.    The adrenal glands noncontrast evaluation is unremarkable. The kidneys are unremarkable in size and contour. There is no hydronephrosis or nephrolithiasis. The ureters appear normal in course and diameter without intra ureteral stone.    Stomach is mostly decompressed.  Small bowel loops are without abnormal dilatation.  Appendix appears to be unremarkable on image 99 series 2.  Colon is not distended and no pericolonic acute strandings.  No bowel obstruction.  No free fluid.    Urinary bladder appears within normal limits. No intravesical stone identified.  There is an intrauterine device.  Left ovarian small cyst is suspected and is favored to be physiologic.  There is  no pelvic free fluid.                                       Medications   ketorolac injection 60 mg (60 mg Intramuscular Given 9/9/24 1903)     Medical Decision Making  The patient is a 24 y.o. female  who presents to the Emergency Department with a chief complaint of dysuria. Symptoms began 1 week ago and have been constant since onset. Her pain is currently rated as a 4/10 in severity and described as aching with no radiation. Associated symptoms include left lower back pain. Symptoms are aggravated with nothing and there are no alleviating factors. The patient denies abdominal pain, nausea, vomiting, fever, or chills. She reports taking nothing prior to arrival with no relief of symptoms. No other reported symptoms at this time.      Judging by the patient's chief complaint and pertinent history, the patient has the following possible differential diagnoses, including but not limited to the following.  Some of these are deemed to be lower likelihood and some more likely based on my physical exam and history combined with possible lab work and/or imaging studies.   Please see the pertinent studies, and refer to the HPI.  Some of these diagnoses will take further evaluation to fully rule out, perhaps as an outpatient and the patient was encouraged to follow up when discharged for more comprehensive evaluation.    Urinary tract infection, STD, kidney stone     Problems Addressed:  Dysuria: acute illness or injury  Left flank pain: acute illness or injury  Left ovarian cyst: acute illness or injury    Amount and/or Complexity of Data Reviewed  Labs:  Decision-making details documented in ED Course.  Radiology: ordered. Decision-making details documented in ED Course.    Risk  Prescription drug management.               ED Course as of 09/09/24 2032   Mon Sep 09, 2024   1906 Urinalysis, Reflex to Urine Culture  No signs of infection  [LM]   2021 CT Abdomen Pelvis  Without Contrast     Impression:     No abdominopelvic  visceral acute findings identified.   [LM]   2028 I discussed results in detail with patient including follow up. She is amendable to plan and ready for discharge home. She was given strict er return precautions.  [LM]      ED Course User Index  [LM] Marco Cazares NP                           Clinical Impression:  Final diagnoses:  [R30.0] Dysuria (Primary)  [R10.9] Left flank pain  [N83.202] Left ovarian cyst          ED Disposition Condition    Discharge Stable          ED Prescriptions       Medication Sig Dispense Start Date End Date Auth. Provider    ketorolac (TORADOL) 10 mg tablet Take 1 tablet (10 mg total) by mouth every 6 (six) hours as needed for Pain. 20 tablet 9/9/2024 9/14/2024 Marco Cazares NP          Follow-up Information       Follow up With Specialties Details Why Contact Info    Please contact [phone] to establish care with a primary care provider  Schedule an appointment as soon as possible for a visit                Marco Cazares NP  09/09/24 2032

## 2024-09-10 NOTE — DISCHARGE INSTRUCTIONS
You have been prescribed Toradol which is an anti-inflammatory pain medication.  Do not take this with any other anti-inflammatory pain medications that you as naproxen, Aleve, or ibuprofen

## 2024-09-17 ENCOUNTER — OFFICE VISIT (OUTPATIENT)
Dept: URGENT CARE | Facility: CLINIC | Age: 24
End: 2024-09-17

## 2024-09-17 VITALS
WEIGHT: 259.06 LBS | BODY MASS INDEX: 48.91 KG/M2 | TEMPERATURE: 99 F | DIASTOLIC BLOOD PRESSURE: 77 MMHG | RESPIRATION RATE: 20 BRPM | SYSTOLIC BLOOD PRESSURE: 119 MMHG | HEIGHT: 61 IN | HEART RATE: 53 BPM | OXYGEN SATURATION: 100 %

## 2024-09-17 DIAGNOSIS — M94.0 COSTOCHONDRITIS: Primary | ICD-10-CM

## 2024-09-17 DIAGNOSIS — R53.83 FATIGUE, UNSPECIFIED TYPE: ICD-10-CM

## 2024-09-17 LAB
CTP QC/QA: YES
CTP QC/QA: YES
POC MOLECULAR INFLUENZA A AGN: NEGATIVE
POC MOLECULAR INFLUENZA B AGN: NEGATIVE
SARS-COV-2 AG RESP QL IA.RAPID: NEGATIVE

## 2024-09-17 PROCEDURE — 87811 SARS-COV-2 COVID19 W/OPTIC: CPT | Mod: PBBFAC | Performed by: FAMILY MEDICINE

## 2024-09-17 PROCEDURE — 99214 OFFICE O/P EST MOD 30 MIN: CPT | Mod: PBBFAC | Performed by: FAMILY MEDICINE

## 2024-09-17 PROCEDURE — 87502 INFLUENZA DNA AMP PROBE: CPT | Mod: PBBFAC | Performed by: FAMILY MEDICINE

## 2024-09-17 PROCEDURE — 99213 OFFICE O/P EST LOW 20 MIN: CPT | Mod: S$PBB,,, | Performed by: FAMILY MEDICINE

## 2024-09-17 RX ORDER — DICLOFENAC SODIUM 75 MG/1
75 TABLET, DELAYED RELEASE ORAL 2 TIMES DAILY
Qty: 20 TABLET | Refills: 0 | Status: SHIPPED | OUTPATIENT
Start: 2024-09-17

## 2024-09-17 NOTE — PROGRESS NOTES
"Subjective:       Patient ID: eBbe Mosley is a 24 y.o. female.    Vitals:  height is 5' 1" (1.549 m) and weight is 117.5 kg (259 lb 0.7 oz). Her temperature is 98.5 °F (36.9 °C). Her blood pressure is 119/77 and her pulse is 53 (abnormal). Her respiration is 20 and oxygen saturation is 100%.     Chief Complaint: Fatigue (With dizziness x 2days     (states worked 6 days in a row and could be why she is fatigued))    Patient with several days of fatigue.  Working long hours.  Having occasional sharp anterior chest pain that is positional, pleuritic, nonexertional.    Fatigue  Associated symptoms include fatigue. Pertinent negatives include no abdominal pain, change in bowel habit, congestion, coughing, fever, joint swelling, myalgias, neck pain, rash, sore throat, swollen glands or vomiting. Nothing aggravates the symptoms. She has tried nothing for the symptoms.         Constitution: Positive for fatigue. Negative for fever.   HENT:  Negative for congestion and sore throat.    Neck: Negative for neck pain.   Respiratory:  Negative for cough.    Gastrointestinal:  Negative for abdominal pain and vomiting.   Musculoskeletal:  Negative for joint swelling and muscle ache.   Skin:  Negative for rash.       Objective:   Physical Exam   Constitutional: She appears well-developed.  Non-toxic appearance. She does not appear ill. No distress.   HENT:   Head: Atraumatic.   Nose: No purulent discharge. Right sinus exhibits no maxillary sinus tenderness and no frontal sinus tenderness. Left sinus exhibits no maxillary sinus tenderness and no frontal sinus tenderness.   Mouth/Throat: Uvula is midline. No oropharyngeal exudate or posterior oropharyngeal erythema.   Eyes: Right eye exhibits no discharge. Left eye exhibits no discharge. Extraocular movement intact   Neck: Neck supple. No neck rigidity present.   Cardiovascular: Regular rhythm.   Pulmonary/Chest: Effort normal and breath sounds normal. No respiratory " distress. She has no wheezes. She has no rhonchi. She has no rales. She exhibits tenderness (reproduces symptoms). She exhibits no crepitus, no deformity and no swelling.       Lymphadenopathy:     She has no cervical adenopathy.   Neurological: She is alert.   Skin: Skin is warm, dry and not diaphoretic.   Psychiatric: Her behavior is normal.   Nursing note and vitals reviewed.    Results for orders placed or performed in visit on 09/17/24   SARS Coronavirus 2 Antigen, POCT Manual Read   Result Value Ref Range    SARS Coronavirus 2 Antigen Negative Negative     Acceptable Yes    POCT Influenza A/B Molecular   Result Value Ref Range    POC Molecular Influenza A Ag Negative Negative    POC Molecular Influenza B Ag Negative Negative     Acceptable Yes          Assessment:     1. Costochondritis    2. Fatigue, unspecified type          Plan:   Discussed costochondritis.  Diclofenac.  Avoid over-the-counter NSAIDs.  Encouraged fluids and monitoring.  ER precautions.  Please read patient education material.    Costochondritis  -     diclofenac (VOLTAREN) 75 MG EC tablet; Take 1 tablet (75 mg total) by mouth 2 (two) times daily.  Dispense: 20 tablet; Refill: 0    Fatigue, unspecified type  -     SARS Coronavirus 2 Antigen, POCT Manual Read  -     POCT Influenza A/B Molecular        Please note: This chart was completed via voice to text dictation. It may contain typographical/word recognition errors. If there are any questions, please contact the provider for final clarification.

## 2024-09-17 NOTE — LETTER
September 17, 2024      [facility]  [address] [address]  Phone: [phone]       Patient: Bebe Mosley   YOB: 2000  Date of Visit: 09/17/2024    To Whom It May Concern:    Jones Mosley  was at [facility] on 09/17/2024. The patient may return to work/school on SEPT 19 2024 with no  restrictions. If you have any questions or concerns, or if I can be of further assistance, please do not hesitate to contact me.    Sincerely,    DEIRDRE PHAN MD

## 2024-10-04 ENCOUNTER — HOSPITAL ENCOUNTER (EMERGENCY)
Facility: HOSPITAL | Age: 24
Discharge: HOME | End: 2024-10-04
Attending: EMERGENCY MEDICINE

## 2024-10-04 VITALS
BODY MASS INDEX: 49.15 KG/M2 | WEIGHT: 260.13 LBS | OXYGEN SATURATION: 100 % | RESPIRATION RATE: 18 BRPM | SYSTOLIC BLOOD PRESSURE: 132 MMHG | TEMPERATURE: 98 F | HEART RATE: 67 BPM | DIASTOLIC BLOOD PRESSURE: 82 MMHG

## 2024-10-04 DIAGNOSIS — R30.0 DYSURIA: Primary | ICD-10-CM

## 2024-10-04 LAB
B-HCG UR QL: NEGATIVE
BACTERIA #/AREA URNS AUTO: ABNORMAL /HPF
BILIRUB UR QL STRIP.AUTO: NEGATIVE
C TRACH DNA SPEC QL NAA+PROBE: NOT DETECTED
CLARITY UR: CLEAR
CLUE CELLS VAG QL WET PREP: NORMAL
COLOR UR AUTO: ABNORMAL
CTP QC/QA: YES
GLUCOSE UR QL STRIP: NORMAL
HGB UR QL STRIP: NEGATIVE
HYALINE CASTS #/AREA URNS LPF: ABNORMAL /LPF
KETONES UR QL STRIP: NEGATIVE
LEUKOCYTE ESTERASE UR QL STRIP: NEGATIVE
N GONORRHOEA DNA SPEC QL NAA+PROBE: NOT DETECTED
NITRITE UR QL STRIP: NEGATIVE
PH UR STRIP: 7.5 [PH]
PROT UR QL STRIP: NEGATIVE
RBC #/AREA URNS AUTO: ABNORMAL /HPF
SOURCE (OHS): NORMAL
SP GR UR STRIP.AUTO: 1.02 (ref 1–1.03)
SQUAMOUS #/AREA URNS LPF: ABNORMAL /HPF
T VAGINALIS VAG QL WET PREP: NORMAL
UROBILINOGEN UR STRIP-ACNC: NORMAL
WBC #/AREA URNS AUTO: ABNORMAL /HPF
WBC #/AREA VAG WET PREP: NORMAL
YEAST SPEC QL WET PREP: NORMAL

## 2024-10-04 PROCEDURE — 81001 URINALYSIS AUTO W/SCOPE: CPT | Performed by: NURSE PRACTITIONER

## 2024-10-04 PROCEDURE — 81025 URINE PREGNANCY TEST: CPT | Performed by: NURSE PRACTITIONER

## 2024-10-04 PROCEDURE — 87210 SMEAR WET MOUNT SALINE/INK: CPT | Performed by: NURSE PRACTITIONER

## 2024-10-04 PROCEDURE — 87591 N.GONORRHOEAE DNA AMP PROB: CPT | Performed by: NURSE PRACTITIONER

## 2024-10-04 PROCEDURE — 87491 CHLMYD TRACH DNA AMP PROBE: CPT | Performed by: NURSE PRACTITIONER

## 2024-10-04 PROCEDURE — 99282 EMERGENCY DEPT VISIT SF MDM: CPT

## 2024-10-04 RX ORDER — PHENAZOPYRIDINE HYDROCHLORIDE 200 MG/1
200 TABLET, FILM COATED ORAL 3 TIMES DAILY
Qty: 6 TABLET | Refills: 0 | Status: SHIPPED | OUTPATIENT
Start: 2024-10-04 | End: 2024-10-06

## 2024-10-04 NOTE — DISCHARGE INSTRUCTIONS
Follow up with your primary care physician in 3-5 days for follow up evaluation.  Take medication as prescribed.  This medication will change the color of your urine. This is a normal effect of the medication.

## 2024-10-04 NOTE — ED PROVIDER NOTES
Encounter Date: 10/4/2024       History     Chief Complaint   Patient presents with    Bladder Pain     Pt w co urinary freq. and pressure on bladder x 3 days.  Yellow vag dc yesterday. Pt concerned she may have peritonitis /google.     Vaginal Discharge     Pt is a 24 y.o. female who presents to the [facility] ED for evaluation experiencing urinary frequency and bladder pressure x 3 days. Reports experiencing vaginal discharge 2 days ago which has now relieved. Denies chest pain, SOB, weakness, dizziness, fever, or loss of bowel or bladder control. Denies chronic medical conditions.      Review of patient's allergies indicates:   Allergen Reactions    Pecan nut Anaphylaxis    Fish containing products Rash     Past Medical History:   Diagnosis Date    Morbid obesity      Past Surgical History:   Procedure Laterality Date     SECTION      TONSILLECTOMY       Family History   Problem Relation Name Age of Onset    Hypertension Father      Diabetes Father       Social History     Tobacco Use    Smoking status: Never    Smokeless tobacco: Never   Substance Use Topics    Alcohol use: Yes     Comment: occassionally    Drug use: Never     Review of Systems   Constitutional:  Negative for chills, diaphoresis, fatigue and fever.   HENT:  Negative for facial swelling, rhinorrhea, sinus pressure, sinus pain, sore throat and trouble swallowing.    Respiratory:  Negative for cough, chest tightness, shortness of breath and wheezing.    Cardiovascular:  Negative for chest pain, palpitations and leg swelling.   Gastrointestinal:  Positive for abdominal pain. Negative for diarrhea, nausea and vomiting.   Genitourinary:  Positive for frequency and vaginal discharge. Negative for dysuria, flank pain, hematuria, urgency, vaginal bleeding and vaginal pain.   Musculoskeletal:  Negative for arthralgias, back pain, joint swelling and myalgias.   Skin:  Negative for color change and rash.   Neurological:  Negative for dizziness, syncope,  weakness and light-headedness.   Hematological:  Does not bruise/bleed easily.   All other systems reviewed and are negative.      Physical Exam     Initial Vitals [10/04/24 1244]   BP Pulse Resp Temp SpO2   132/82 67 18 97.9 °F (36.6 °C) 100 %      MAP       --         Physical Exam    Nursing note and vitals reviewed.  Constitutional: She appears well-developed and well-nourished.   HENT:   Head: Normocephalic and atraumatic.   Nose: Nose normal. Mouth/Throat: Oropharynx is clear and moist.   Eyes: Conjunctivae and EOM are normal. Pupils are equal, round, and reactive to light.   Neck: Neck supple.   Normal range of motion.  Cardiovascular:  Normal rate, regular rhythm, normal heart sounds and intact distal pulses.           Pulmonary/Chest: Effort normal and breath sounds normal. No respiratory distress. She has no wheezes. She has no rhonchi. She has no rales. She exhibits no tenderness.   Abdominal: Abdomen is soft and flat. Bowel sounds are normal. She exhibits no distension. There is abdominal tenderness in the suprapubic area. There is no rebound, no guarding, no tenderness at McBurney's point and negative Umaña's sign. negative psoas sign  Musculoskeletal:         General: Normal range of motion.      Cervical back: Normal range of motion and neck supple.     Neurological: She is alert and oriented to person, place, and time. She has normal strength and normal reflexes.   Skin: Skin is warm and dry. Capillary refill takes less than 2 seconds.   Psychiatric: She has a normal mood and affect. Her speech is normal and behavior is normal. Judgment and thought content normal.         ED Course   Procedures  Labs Reviewed   URINALYSIS, REFLEX TO URINE CULTURE - Abnormal       Result Value    Color, UA Light-Yellow      Appearance, UA Clear      Specific Gravity, UA 1.023      pH, UA 7.5      Protein, UA Negative      Glucose, UA Normal      Ketones, UA Negative      Blood, UA Negative      Bilirubin, UA Negative       Urobilinogen, UA Normal      Nitrites, UA Negative      Leukocyte Esterase, UA Negative      RBC, UA 0-5      WBC, UA 0-5      Bacteria, UA None Seen      Squamous Epithelial Cells, UA Occ (*)     Hyaline Casts, UA None Seen     WET PREP, GENITAL - Normal    WBC, Wet Prep None Seen      Clue Cells, Wet Prep None Seen      Trichomonas, Wet Prep None Seen      Yeast, Wet Prep None Seen     CHLAMYDIA/GONORRHOEAE(GC), PCR   POCT URINE PREGNANCY    POC Preg Test, Ur Negative       Acceptable Yes            Imaging Results    None          Medications - No data to display  Medical Decision Making  Differential:  Cystitis  BV  VD  Candidiasis      Amount and/or Complexity of Data Reviewed  Labs: ordered.               ED Course as of 10/04/24 1511   Fri Oct 04, 2024   1509 Given strict ED return precautions. I have spoken with the patient and/or caregivers. I have explained the patient's condition, diagnoses and treatment plan based on the information available to me at this time. I have answered the patient's and/or caregiver's questions and addressed any concerns. The patient and/or caregivers have as good an understanding of the patient's diagnosis, condition and treatment plan as can be expected at this point. The vital signs have been stable. The patient's condition is stable and appropriate for discharge from the emergency department.      The patient will pursue further outpatient evaluation with the primary care physician or other designated or consulting physician as outlined in the discharge instructions. The patient and/or caregivers are agreeable to this plan of care and follow-up instructions have been explained in detail. The patient and/or caregivers have received these instructions in written format and have expressed an understanding of the discharge instructions. The patient and/or caregivers are aware that any significant change in condition or worsening of symptoms should prompt an  immediate return to this or the closest emergency department or a call to 911.   [JA]      ED Course User Index  [JA] Uriel Duarte Jr., FNP                           Clinical Impression:  Final diagnoses:  [R30.0] Dysuria (Primary)          ED Disposition Condition    Discharge Stable          ED Prescriptions       Medication Sig Dispense Start Date End Date Auth. Provider    phenazopyridine (PYRIDIUM) 200 MG tablet Take 1 tablet (200 mg total) by mouth 3 (three) times daily. for 2 days 6 tablet 10/4/2024 10/6/2024 Uriel Duarte Jr., FNP          Follow-up Information       Follow up With Specialties Details Why Contact Info    [facility] - Emergency Dept Emergency Medicine In 3 days As needed, If symptoms worsen [address] [id number] [phone]             Uriel Duarte Jr., FNP  10/04/24 6945

## 2024-11-03 ENCOUNTER — OFFICE VISIT (OUTPATIENT)
Dept: URGENT CARE | Facility: CLINIC | Age: 24
End: 2024-11-03

## 2024-11-03 VITALS
TEMPERATURE: 98 F | DIASTOLIC BLOOD PRESSURE: 62 MMHG | HEART RATE: 66 BPM | OXYGEN SATURATION: 99 % | RESPIRATION RATE: 18 BRPM | SYSTOLIC BLOOD PRESSURE: 103 MMHG | BODY MASS INDEX: 49.37 KG/M2 | HEIGHT: 61 IN | WEIGHT: 261.5 LBS

## 2024-11-03 DIAGNOSIS — M54.50 ACUTE MIDLINE LOW BACK PAIN WITHOUT SCIATICA: ICD-10-CM

## 2024-11-03 DIAGNOSIS — M25.562 ACUTE PAIN OF LEFT KNEE: Primary | ICD-10-CM

## 2024-11-03 PROCEDURE — 99214 OFFICE O/P EST MOD 30 MIN: CPT | Mod: PBBFAC

## 2024-11-03 PROCEDURE — 99213 OFFICE O/P EST LOW 20 MIN: CPT | Mod: S$PBB,,,

## 2024-11-03 RX ORDER — NAPROXEN 500 MG/1
500 TABLET ORAL 2 TIMES DAILY
Qty: 20 TABLET | Refills: 0 | Status: SHIPPED | OUTPATIENT
Start: 2024-11-03 | End: 2024-11-13

## 2024-11-04 NOTE — PROGRESS NOTES
"Subjective:      Patient ID: Bebe Mosley is a 24 y.o. female.    Vitals:  height is 5' 1" (1.549 m) and weight is 118.6 kg (261 lb 8 oz). Her temperature is 97.7 °F (36.5 °C). Her blood pressure is 103/62 and her pulse is 66. Her respiration is 18 and oxygen saturation is 99%.     Chief Complaint: Pain (Lt knee pain since 10/31. Denies any edema or bruising. Mid back pain x 4 days. No known injury.)    Bebe Mosley is a 24 year old female who presents to urgent care with left knee and mid back pain x 4 days. Denies any injuries or falls. Denies any bruising or edema. She has taken Tylenol arthritis which has not given relief.     Pain  Associated symptoms include arthralgias. Pertinent negatives include no joint swelling.       Musculoskeletal:  Positive for pain, joint pain and back pain. Negative for joint swelling and abnormal ROM of joint.      Objective:   Physical Exam   Constitutional: She is oriented to person, place, and time. She appears well-developed. She is cooperative. No distress.   HENT:   Head: Normocephalic and atraumatic.   Nose: Nose normal.   Mouth/Throat: Oropharynx is clear and moist and mucous membranes are normal.   Eyes: Conjunctivae and lids are normal.   Neck: Trachea normal and phonation normal. Neck supple.   Cardiovascular: Normal rate, regular rhythm, normal heart sounds and normal pulses.   Pulmonary/Chest: Effort normal and breath sounds normal.   Abdominal: Normal appearance and bowel sounds are normal. She exhibits no mass. Soft.   Musculoskeletal:         General: No deformity.      Right knee: Normal.      Left knee: Tenderness found.      Thoracic back: Normal.      Lumbar back: She exhibits tenderness. She exhibits normal range of motion, no swelling and no edema.      Left upper leg: Normal.      Left lower leg: Normal.   Neurological: She is alert and oriented to person, place, and time. She has normal strength and normal reflexes. No sensory deficit.   Skin: Skin is " warm, dry, intact and not diaphoretic.   Psychiatric: Her speech is normal and behavior is normal. Judgment and thought content normal.   Nursing note and vitals reviewed.    Assessment:     1. Acute pain of left knee    2. Acute midline low back pain without sciatica      Plan:     Acute pain of left knee  -     naproxen (NAPROSYN) 500 MG tablet; Take 1 tablet (500 mg total) by mouth 2 (two) times daily. for 10 days  Dispense: 20 tablet; Refill: 0    Acute midline low back pain without sciatica  -     naproxen (NAPROSYN) 500 MG tablet; Take 1 tablet (500 mg total) by mouth 2 (two) times daily. for 10 days  Dispense: 20 tablet; Refill: 0      Start Naproxen 500 mg BID as needed for pain.   Lower back stretches daily.  Avoid strenuous lifting, use proper body mechanics.  Heating pad, Ice pack, Biofreeze or Epsom salt baths as needed.  Exercise to strengthen core muscles to support your back.  Follow up to urgent care or PCP as needed.

## 2024-11-05 ENCOUNTER — HOSPITAL ENCOUNTER (EMERGENCY)
Facility: HOSPITAL | Age: 24
Discharge: HOME | End: 2024-11-05
Attending: STUDENT IN AN ORGANIZED HEALTH CARE EDUCATION/TRAINING PROGRAM

## 2024-11-05 VITALS
TEMPERATURE: 98 F | SYSTOLIC BLOOD PRESSURE: 116 MMHG | HEIGHT: 61 IN | DIASTOLIC BLOOD PRESSURE: 56 MMHG | HEART RATE: 57 BPM | WEIGHT: 261 LBS | RESPIRATION RATE: 20 BRPM | BODY MASS INDEX: 49.28 KG/M2 | OXYGEN SATURATION: 98 %

## 2024-11-05 DIAGNOSIS — M62.830 LUMBAR PARASPINAL MUSCLE SPASM: Primary | ICD-10-CM

## 2024-11-05 LAB
B-HCG UR QL: NEGATIVE
BILIRUB UR QL STRIP.AUTO: NEGATIVE
CLARITY UR: CLEAR
COLOR UR AUTO: NORMAL
GLUCOSE UR QL STRIP: NEGATIVE
HGB UR QL STRIP: NEGATIVE
KETONES UR QL STRIP: NEGATIVE
LEUKOCYTE ESTERASE UR QL STRIP: NEGATIVE
NITRITE UR QL STRIP: NEGATIVE
PH UR STRIP: 7 [PH]
PROT UR QL STRIP: NEGATIVE
SP GR UR STRIP.AUTO: 1.01 (ref 1–1.03)
UROBILINOGEN UR STRIP-ACNC: 0.2

## 2024-11-05 PROCEDURE — 63600175 PHARM REV CODE 636 W HCPCS: Performed by: STUDENT IN AN ORGANIZED HEALTH CARE EDUCATION/TRAINING PROGRAM

## 2024-11-05 PROCEDURE — 99284 EMERGENCY DEPT VISIT MOD MDM: CPT | Mod: 25

## 2024-11-05 PROCEDURE — 81003 URINALYSIS AUTO W/O SCOPE: CPT | Performed by: STUDENT IN AN ORGANIZED HEALTH CARE EDUCATION/TRAINING PROGRAM

## 2024-11-05 PROCEDURE — 81025 URINE PREGNANCY TEST: CPT | Performed by: STUDENT IN AN ORGANIZED HEALTH CARE EDUCATION/TRAINING PROGRAM

## 2024-11-05 PROCEDURE — 96372 THER/PROPH/DIAG INJ SC/IM: CPT | Performed by: STUDENT IN AN ORGANIZED HEALTH CARE EDUCATION/TRAINING PROGRAM

## 2024-11-05 RX ORDER — KETOROLAC TROMETHAMINE 30 MG/ML
30 INJECTION, SOLUTION INTRAMUSCULAR; INTRAVENOUS
Status: COMPLETED | OUTPATIENT
Start: 2024-11-05 | End: 2024-11-05

## 2024-11-05 RX ADMIN — KETOROLAC TROMETHAMINE 30 MG: 30 INJECTION, SOLUTION INTRAMUSCULAR at 07:11

## 2024-11-05 NOTE — ED PROVIDER NOTES
Encounter Date: 2024       History     Chief Complaint   Patient presents with    Back Pain     Pt c/o low back pain onset four days ago, along with nausea.     HPI    24-year-old female with a past medical history of obesity presents emergency department for back pain that has been going on for 4 days.  Patient states she was also having some intermittent nausea.  She states that she is currently not nauseated and thinks she was nauseated few days ago because it constipation which has resolved.  She states that she was concerned that her back pain is because of her kidneys.  No medications taken today for the pain.  Took some naproxen for her knee pain yesterday which did help slightly with her back.  No fever.  No burning on urination.    Review of patient's allergies indicates:   Allergen Reactions    Pecan nut Anaphylaxis    Fish containing products Rash     Past Medical History:   Diagnosis Date    Morbid obesity      Past Surgical History:   Procedure Laterality Date     SECTION      TONSILLECTOMY       Family History   Problem Relation Name Age of Onset    Hypertension Father      Diabetes Father       Social History     Tobacco Use    Smoking status: Never    Smokeless tobacco: Never   Substance Use Topics    Alcohol use: Yes     Comment: occassionally    Drug use: Never     Review of Systems   Constitutional:  Negative for fever.   Respiratory:  Negative for cough.    Cardiovascular:  Negative for chest pain.   Gastrointestinal:  Negative for abdominal pain, constipation, diarrhea, nausea and vomiting.   Musculoskeletal:  Positive for back pain.   Neurological:  Negative for headaches.   All other systems reviewed and are negative.      Physical Exam     Initial Vitals [24 0655]   BP Pulse Resp Temp SpO2   (!) 116/56 (!) 57 20 97.5 °F (36.4 °C) 98 %      MAP       --         Physical Exam    Nursing note and vitals reviewed.  Constitutional: She appears well-developed and well-nourished.  No distress.   Obese    Cardiovascular:  Normal rate and regular rhythm.           Pulmonary/Chest: Breath sounds normal. No respiratory distress. She has no wheezes. She has no rhonchi. She has no rales.   Abdominal: Abdomen is soft. There is no abdominal tenderness.   No CVA tenderness There is no rebound and no guarding.   Musculoskeletal:         General: Tenderness (tenderness to the paraspinal musculature of the lumbar spine which reproduces patient's complaint) present. Normal range of motion.     Neurological: She is alert and oriented to person, place, and time. She has normal strength.   Skin: Skin is warm. Capillary refill takes less than 2 seconds.         ED Course   Procedures  Labs Reviewed   PREGNANCY TEST, URINE RAPID - Normal       Result Value    hCG Qualitative, Urine Negative     URINALYSIS, REFLEX TO URINE CULTURE - Normal    Color, UA Straw      Appearance, UA Clear      Specific Gravity, UA 1.010      pH, UA 7.0      Protein, UA Negative      Glucose, UA Negative      Ketones, UA Negative      Blood, UA Negative      Bilirubin, UA Negative      Urobilinogen, UA 0.2      Nitrites, UA Negative      Leukocyte Esterase, UA Negative            Imaging Results    None          Medications   ketorolac injection 30 mg (has no administration in time range)     Medical Decision Making  Initial Assessment:       Back pain      Differential Diagnosis:   Judging by the patient's chief complaint and pertinent history, the patient has the following possible differential diagnoses, including but not limited to the following.  Some of these are deemed to be lower likelihood and some more likely based on my physical exam and history combined with possible lab work and/or imaging studies.   Please see the pertinent studies, and refer to the HPI.  Some of these diagnoses will take further evaluation to fully rule out, perhaps as an outpatient and the patient was encouraged to follow up when discharged for more  comprehensive evaluation.      Muscle spasm, urolithiasis, UTI, pyelonephritis,  as well as multiple other possible etiologies      Problems Addressed:  Lumbar paraspinal muscle spasm: acute illness or injury    Amount and/or Complexity of Data Reviewed  External Data Reviewed: labs and notes.  Labs:  Decision-making details documented in ED Course.    Risk  Prescription drug management.  Diagnosis or treatment significantly limited by social determinants of health.               ED Course as of 11/05/24 0720 Tue Nov 05, 2024   0718 Leukocyte Esterase, UA: Negative [BS]   0718 NITRITE UA: Negative [BS]   0718 Blood, UA: Negative [BS]   0718 hCG Qualitative, Urine: Negative [BS]      ED Course User Index  [BS] Slao Khan MD                           Clinical Impression:  Final diagnoses:  [M62.830] Lumbar paraspinal muscle spasm (Primary)          ED Disposition Condition    Discharge Stable          ED Prescriptions    None       Follow-up Information       Follow up With Specialties Details Why Contact Info    [facility] - Emergency Dept Emergency Medicine Go to  If symptoms worsen [id number]  [facility] [id number] [phone]             Salo Khan MD  11/05/24 0720

## 2024-11-18 ENCOUNTER — OFFICE VISIT (OUTPATIENT)
Dept: GYNECOLOGY | Facility: CLINIC | Age: 24
End: 2024-11-18

## 2024-11-18 VITALS
OXYGEN SATURATION: 100 % | SYSTOLIC BLOOD PRESSURE: 100 MMHG | BODY MASS INDEX: 48.6 KG/M2 | WEIGHT: 257.38 LBS | HEIGHT: 61 IN | TEMPERATURE: 99 F | RESPIRATION RATE: 20 BRPM | DIASTOLIC BLOOD PRESSURE: 70 MMHG | HEART RATE: 82 BPM

## 2024-11-18 DIAGNOSIS — Z30.431 INTRAUTERINE DEVICE SURVEILLANCE: ICD-10-CM

## 2024-11-18 DIAGNOSIS — N89.8 VAGINAL ODOR: ICD-10-CM

## 2024-11-18 DIAGNOSIS — Z11.3 ROUTINE SCREENING FOR STI (SEXUALLY TRANSMITTED INFECTION): ICD-10-CM

## 2024-11-18 DIAGNOSIS — E66.813 CLASS 3 SEVERE OBESITY WITH BODY MASS INDEX (BMI) OF 45.0 TO 49.9 IN ADULT, UNSPECIFIED OBESITY TYPE, UNSPECIFIED WHETHER SERIOUS COMORBIDITY PRESENT: ICD-10-CM

## 2024-11-18 DIAGNOSIS — E66.01 CLASS 3 SEVERE OBESITY WITH BODY MASS INDEX (BMI) OF 45.0 TO 49.9 IN ADULT, UNSPECIFIED OBESITY TYPE, UNSPECIFIED WHETHER SERIOUS COMORBIDITY PRESENT: ICD-10-CM

## 2024-11-18 DIAGNOSIS — Z01.419 ENCOUNTER FOR ANNUAL ROUTINE GYNECOLOGICAL EXAMINATION: Primary | ICD-10-CM

## 2024-11-18 PROCEDURE — 99213 OFFICE O/P EST LOW 20 MIN: CPT | Mod: PBBFAC | Performed by: NURSE PRACTITIONER

## 2024-11-18 PROCEDURE — 87591 N.GONORRHOEAE DNA AMP PROB: CPT | Performed by: NURSE PRACTITIONER

## 2024-11-18 PROCEDURE — 99395 PREV VISIT EST AGE 18-39: CPT | Mod: S$PBB,,, | Performed by: NURSE PRACTITIONER

## 2024-11-18 PROCEDURE — 87210 SMEAR WET MOUNT SALINE/INK: CPT | Performed by: NURSE PRACTITIONER

## 2024-11-18 NOTE — PROGRESS NOTES
"  [facility] -  Gynecology / Women's Health Clinic      Subjective:       Patient ID: Bebe Mosley is a 24 y.o. female.    Chief Complaint:  Gynecologic Exam    History of Present Illness  The patient  here for annual exam. Pt had Leila IUD placed on 5/10/23. She has had monthly cycles which she feels were heavy, changing pads 2-3x/day. Her LMP was 10/29/24. Period last 5 days with just spotting. Used Depo in past. Denies history of abnormal paps. Last pap -NIL & HPV neg. Denies breast or urinary complaints. Denies pelvic pain or discharge. Pt c/o vaginal odor, uses Tumeric and Belinda Spring soap in baths. Treated for chlamydia and desires testing. HPV vaccine completed. Denies tobacco use. Dep. screening 0. Denies fly hx of breast, ovarian, uterine or colon cancer.      GYN & OB History  Patient's last menstrual period was 10/28/2024 (exact date).   Date of Last Pap: 2023    Review of patient's allergies indicates:   Allergen Reactions    Pecan nut Anaphylaxis    Fish containing products Rash     Past Medical History:   Diagnosis Date    Morbid obesity      OB History    Para Term  AB Living   1 1           SAB IAB Ectopic Multiple Live Births                  # Outcome Date GA Lbr Cody/2nd Weight Sex Type Anes PTL Lv   1 Para                 Review of Systems  Review of Systems    Negative except for pertinent findings for positives per HPI     Objective:    Physical Exam    /70 (BP Location: Left arm, Patient Position: Sitting)   Pulse 82   Temp 98.6 °F (37 °C) (Oral)   Resp 20   Ht 5' 1" (1.549 m)   Wt 116.8 kg (257 lb 6.4 oz)   LMP 10/28/2024 (Exact Date)   SpO2 100%   BMI 48.64 kg/m²   GENERAL: Well-developed female. No acute distress.    SKIN: Normal to inspection, warm and intact.  BREASTS: No rashes or erythema. No masses, lumps, discharge, tenderness.  VULVA: General appearance normal; external genitalia with no lesions or " erythema.  VAGINA: Mucosa/vaginal vault pink,white vaginal discharge, no lesions.  CERVIX: Pink, nulliparous appearing os with visible IUD string, no erythema or abnormal discharge.  BIMANUAL EXAM: Limited exam d/t body habitus. The uterus is non tender. Maxime adnexa reveal no tenderness.  PSYCHIATRIC: Patient is oriented to person, place, and time. Mood and affect are normal.    Assessment:         ICD-10-CM ICD-9-CM   1. Encounter for annual routine gynecological examination  Z01.419 V72.31   2. Routine screening for STI (sexually transmitted infection)  Z11.3 V74.5   3. Intrauterine device surveillance  Z30.431 V25.42   4. Class 3 severe obesity with body mass index (BMI) of 45.0 to 49.9 in adult, unspecified obesity type, unspecified whether serious comorbidity present  E66.813 278.01    E66.01 V85.42    Z68.42    5. Vaginal odor  N89.8 625.8     Plan:   Bebe was seen today for gynecologic exam.    Diagnoses and all orders for this visit:    Encounter for annual routine gynecological examination    Routine screening for STI (sexually transmitted infection)  -     Chlamydia/GC, PCR  -     Wet Prep, Genital  -     HIV 1/2 Ag/Ab (4th Gen); Future  -     Hepatitis C Antibody; Future  -     Hepatitis B Surface Antigen; Future  -     SYPHILIS ANTIBODY (WITH REFLEX RPR); Future    Intrauterine device surveillance    Class 3 severe obesity with body mass index (BMI) of 45.0 to 49.9 in adult, unspecified obesity type, unspecified whether serious comorbidity present    Vaginal odor  -     Wet Prep, Genital     Pelvic today, pap utd per ACOG  STI screen  IUD strings visible, due out 2026  Healthy lifestyle choices. Importance of maintaining a healthy BMI. Healthy diet including limiting fast foods, processed foods, foods containing high amounts of saturated fats or high sodium content. Recommend low carb, low fat diet rich in lean meat and fish, non starchy vegetables, fruits and good fat while maintaining portion control.  Limit sugar intake. Recommended plenty of water, avoid carbonated beverages and high fructose corn syrup. Regular cardiovascular exercise, at least 150 minutes of cardiovascular exercise (at least 30 mins 5x/week).  Change soap to Dove unscented  Follow up in about 1 year (around 11/18/2025) for annual exam.

## 2025-02-07 ENCOUNTER — HOSPITAL ENCOUNTER (EMERGENCY)
Facility: HOSPITAL | Age: 25
Discharge: HOME | End: 2025-02-07
Attending: EMERGENCY MEDICINE

## 2025-02-07 VITALS
DIASTOLIC BLOOD PRESSURE: 79 MMHG | WEIGHT: 250 LBS | HEIGHT: 61 IN | RESPIRATION RATE: 20 BRPM | SYSTOLIC BLOOD PRESSURE: 124 MMHG | HEART RATE: 84 BPM | BODY MASS INDEX: 47.2 KG/M2 | OXYGEN SATURATION: 99 % | TEMPERATURE: 98 F

## 2025-02-07 DIAGNOSIS — R10.32 LLQ ABDOMINAL PAIN: Primary | ICD-10-CM

## 2025-02-07 LAB
ALBUMIN SERPL-MCNC: 3.7 G/DL (ref 3.5–5)
ALBUMIN/GLOB SERPL: 0.9 RATIO (ref 1.1–2)
ALP SERPL-CCNC: 63 UNIT/L (ref 40–150)
ALT SERPL-CCNC: 18 UNIT/L (ref 0–55)
ANION GAP SERPL CALC-SCNC: 5 MEQ/L
AST SERPL-CCNC: 18 UNIT/L (ref 5–34)
B-HCG UR QL: NEGATIVE
BACTERIA #/AREA URNS AUTO: ABNORMAL /HPF
BASOPHILS # BLD AUTO: 0.04 X10(3)/MCL
BASOPHILS NFR BLD AUTO: 0.5 %
BILIRUB SERPL-MCNC: 0.4 MG/DL
BILIRUB UR QL STRIP.AUTO: NEGATIVE
BUN SERPL-MCNC: 12.8 MG/DL (ref 7–18.7)
CALCIUM SERPL-MCNC: 9 MG/DL (ref 8.4–10.2)
CHLORIDE SERPL-SCNC: 108 MMOL/L (ref 98–107)
CLARITY UR: CLEAR
CO2 SERPL-SCNC: 26 MMOL/L (ref 22–29)
COLOR UR AUTO: ABNORMAL
CREAT SERPL-MCNC: 0.83 MG/DL (ref 0.55–1.02)
CREAT/UREA NIT SERPL: 15
EOSINOPHIL # BLD AUTO: 0.04 X10(3)/MCL (ref 0–0.9)
EOSINOPHIL NFR BLD AUTO: 0.5 %
ERYTHROCYTE [DISTWIDTH] IN BLOOD BY AUTOMATED COUNT: 12.7 % (ref 11.5–17)
GFR SERPLBLD CREATININE-BSD FMLA CKD-EPI: >60 ML/MIN/1.73/M2
GLOBULIN SER-MCNC: 4.3 GM/DL (ref 2.4–3.5)
GLUCOSE SERPL-MCNC: 102 MG/DL (ref 74–100)
GLUCOSE UR QL STRIP: NEGATIVE
HCT VFR BLD AUTO: 40.6 % (ref 37–47)
HGB BLD-MCNC: 13.8 G/DL (ref 12–16)
HGB UR QL STRIP: NEGATIVE
IMM GRANULOCYTES # BLD AUTO: 0.03 X10(3)/MCL (ref 0–0.04)
IMM GRANULOCYTES NFR BLD AUTO: 0.4 %
KETONES UR QL STRIP: NEGATIVE
LEUKOCYTE ESTERASE UR QL STRIP: NEGATIVE
LYMPHOCYTES # BLD AUTO: 1.74 X10(3)/MCL (ref 0.6–4.6)
LYMPHOCYTES NFR BLD AUTO: 22.7 %
MCH RBC QN AUTO: 31.9 PG (ref 27–31)
MCHC RBC AUTO-ENTMCNC: 34 G/DL (ref 33–36)
MCV RBC AUTO: 93.8 FL (ref 80–94)
MONOCYTES # BLD AUTO: 0.6 X10(3)/MCL (ref 0.1–1.3)
MONOCYTES NFR BLD AUTO: 7.8 %
MUCOUS THREADS URNS QL MICRO: ABNORMAL /LPF
NEUTROPHILS # BLD AUTO: 5.21 X10(3)/MCL (ref 2.1–9.2)
NEUTROPHILS NFR BLD AUTO: 68.1 %
NITRITE UR QL STRIP: NEGATIVE
NRBC BLD AUTO-RTO: 0 %
PH UR STRIP: 7 [PH]
PLATELET # BLD AUTO: 315 X10(3)/MCL (ref 130–400)
PMV BLD AUTO: 9.6 FL (ref 7.4–10.4)
POTASSIUM SERPL-SCNC: 4.2 MMOL/L (ref 3.5–5.1)
PROT SERPL-MCNC: 8 GM/DL (ref 6.4–8.3)
PROT UR QL STRIP: ABNORMAL
RBC # BLD AUTO: 4.33 X10(6)/MCL (ref 4.2–5.4)
RBC #/AREA URNS AUTO: ABNORMAL /HPF
SODIUM SERPL-SCNC: 139 MMOL/L (ref 136–145)
SP GR UR STRIP.AUTO: 1.02 (ref 1–1.03)
SQUAMOUS #/AREA URNS AUTO: ABNORMAL /HPF
UROBILINOGEN UR STRIP-ACNC: 0.2
WBC # BLD AUTO: 7.66 X10(3)/MCL (ref 4.5–11.5)
WBC #/AREA URNS AUTO: ABNORMAL /HPF

## 2025-02-07 PROCEDURE — 81003 URINALYSIS AUTO W/O SCOPE: CPT

## 2025-02-07 PROCEDURE — 63600175 PHARM REV CODE 636 W HCPCS: Mod: JZ,TB

## 2025-02-07 PROCEDURE — 81025 URINE PREGNANCY TEST: CPT

## 2025-02-07 PROCEDURE — 85025 COMPLETE CBC W/AUTO DIFF WBC: CPT

## 2025-02-07 PROCEDURE — 99285 EMERGENCY DEPT VISIT HI MDM: CPT | Mod: 25

## 2025-02-07 PROCEDURE — 96374 THER/PROPH/DIAG INJ IV PUSH: CPT

## 2025-02-07 PROCEDURE — 80053 COMPREHEN METABOLIC PANEL: CPT

## 2025-02-07 PROCEDURE — 96375 TX/PRO/DX INJ NEW DRUG ADDON: CPT

## 2025-02-07 RX ORDER — ONDANSETRON 4 MG/1
4 TABLET, ORALLY DISINTEGRATING ORAL EVERY 6 HOURS PRN
Qty: 20 TABLET | Refills: 0 | Status: SHIPPED | OUTPATIENT
Start: 2025-02-07

## 2025-02-07 RX ORDER — KETOROLAC TROMETHAMINE 30 MG/ML
30 INJECTION, SOLUTION INTRAMUSCULAR; INTRAVENOUS
Status: COMPLETED | OUTPATIENT
Start: 2025-02-07 | End: 2025-02-07

## 2025-02-07 RX ORDER — KETOROLAC TROMETHAMINE 10 MG/1
10 TABLET, FILM COATED ORAL EVERY 6 HOURS PRN
Qty: 20 TABLET | Refills: 0 | Status: SHIPPED | OUTPATIENT
Start: 2025-02-07 | End: 2025-02-12

## 2025-02-07 RX ORDER — ONDANSETRON HYDROCHLORIDE 2 MG/ML
4 INJECTION, SOLUTION INTRAVENOUS
Status: COMPLETED | OUTPATIENT
Start: 2025-02-07 | End: 2025-02-07

## 2025-02-07 RX ADMIN — ONDANSETRON 4 MG: 2 INJECTION INTRAMUSCULAR; INTRAVENOUS at 02:02

## 2025-02-07 RX ADMIN — KETOROLAC TROMETHAMINE 30 MG: 30 INJECTION, SOLUTION INTRAMUSCULAR at 02:02

## 2025-02-07 NOTE — ED PROVIDER NOTES
Encounter Date: 2025       History     Chief Complaint   Patient presents with    Abdominal Pain     Pt c/o abd cramping onset three days ago. Pt c/o nausea, no vomiting or diarrhea.     The patient is a 24 y.o. female who presents to the Emergency Department with a chief complaint of lower abdominal pain. Symptoms began 3 days ago and have been constant since onset. Her pain is currently rated as a 5/10 in severity and described as sharp with no radiation. Associated symptoms include nausea and vomiting. Symptoms are aggravated with nothing and there are no alleviating factors. The patient denies fever or chills. She reports taking nothing prior to arrival with no relief of symptoms. No other reported symptoms at this time.      The history is provided by the patient. No  was used.   Abdominal Pain  The current episode started several days ago. The onset of the illness was gradual. The problem has not changed since onset.The abdominal pain radiates to the LLQ. The severity of the abdominal pain is 5/10. The abdominal pain is relieved by nothing. The other symptoms of the illness include nausea and vomiting. The other symptoms of the illness do not include fever, shortness of breath or dysuria.   Nausea began 3 to 5 days ago.   The vomiting began today. Vomiting occurred once.   The patient states that she believes she is currently not pregnant. Symptoms associated with the illness do not include back pain.     Review of patient's allergies indicates:   Allergen Reactions    Pecan nut Anaphylaxis    Fish containing products Rash     Past Medical History:   Diagnosis Date    Morbid obesity      Past Surgical History:   Procedure Laterality Date     SECTION      TONSILLECTOMY       Family History   Problem Relation Name Age of Onset    Hypertension Father      Diabetes Father       Social History     Tobacco Use    Smoking status: Never     Passive exposure: Never    Smokeless tobacco:  Never   Substance Use Topics    Alcohol use: Yes     Comment: occassionally    Drug use: Never     Review of Systems   Constitutional:  Negative for fever.   HENT:  Negative for sore throat.    Respiratory:  Negative for shortness of breath.    Cardiovascular:  Negative for chest pain.   Gastrointestinal:  Positive for abdominal pain, nausea and vomiting.   Genitourinary:  Negative for dysuria.   Musculoskeletal:  Negative for back pain.   Skin:  Negative for rash.   Neurological:  Negative for weakness.   Hematological:  Does not bruise/bleed easily.   All other systems reviewed and are negative.      Physical Exam     Initial Vitals [02/07/25 1317]   BP Pulse Resp Temp SpO2   118/83 87 20 97.7 °F (36.5 °C) 98 %      MAP       --         Physical Exam    Nursing note and vitals reviewed.  Constitutional: She appears well-developed and well-nourished.   HENT:   Head: Normocephalic.   Right Ear: Hearing and tympanic membrane normal.   Left Ear: Hearing and tympanic membrane normal. Mouth/Throat: Uvula is midline, oropharynx is clear and moist and mucous membranes are normal.   Eyes: Conjunctivae and EOM are normal. Pupils are equal, round, and reactive to light.   Cardiovascular:  Normal rate, regular rhythm, normal heart sounds and normal pulses.           Pulmonary/Chest: Effort normal and breath sounds normal.   Abdominal: Abdomen is soft. Bowel sounds are normal. There is abdominal tenderness in the left lower quadrant.     Lymphadenopathy:     She has no cervical adenopathy.   Neurological: She is alert. GCS eye subscore is 4. GCS verbal subscore is 5. GCS motor subscore is 6.   Skin: Skin is warm, dry and intact. Capillary refill takes less than 2 seconds.         ED Course   Procedures  Labs Reviewed   COMPREHENSIVE METABOLIC PANEL - Abnormal       Result Value    Sodium 139      Potassium 4.2      Chloride 108 (*)     CO2 26      Glucose 102 (*)     Blood Urea Nitrogen 12.8      Creatinine 0.83      Calcium  9.0      Protein Total 8.0      Albumin 3.7      Globulin 4.3 (*)     Albumin/Globulin Ratio 0.9 (*)     Bilirubin Total 0.4      ALP 63      ALT 18      AST 18      eGFR >60      Anion Gap 5.0      BUN/Creatinine Ratio 15     URINALYSIS, REFLEX TO URINE CULTURE - Abnormal    Color, UA Straw      Appearance, UA Clear      Specific Gravity, UA 1.025      pH, UA 7.0      Protein, UA Trace (*)     Glucose, UA Negative      Ketones, UA Negative      Blood, UA Negative      Bilirubin, UA Negative      Urobilinogen, UA 0.2      Nitrites, UA Negative      Leukocyte Esterase, UA Negative     CBC WITH DIFFERENTIAL - Abnormal    WBC 7.66      RBC 4.33      Hgb 13.8      Hct 40.6      MCV 93.8      MCH 31.9 (*)     MCHC 34.0      RDW 12.7      Platelet 315      MPV 9.6      Neut % 68.1      Lymph % 22.7      Mono % 7.8      Eos % 0.5      Basophil % 0.5      Imm Grans % 0.4      Neut # 5.21      Lymph # 1.74      Mono # 0.60      Eos # 0.04      Baso # 0.04      Imm Gran # 0.03      NRBC% 0.0     URINALYSIS, MICROSCOPIC - Abnormal    Bacteria, UA None Seen      Mucous, UA Occ (*)     RBC, UA None Seen      WBC, UA 0-5      Squamous Epithelial Cells, UA Few (*)    PREGNANCY TEST, URINE RAPID - Normal    hCG Qualitative, Urine Negative     CBC W/ AUTO DIFFERENTIAL    Narrative:     The following orders were created for panel order CBC auto differential.  Procedure                               Abnormality         Status                     ---------                               -----------         ------                     CBC with Differential[[id number]]       Abnormal            Final result                 Please view results for these tests on the individual orders.          Imaging Results              US PELVIS COMPLETE NON OB WITH DOPPLER (XPD) (Final result)  Result time 02/07/25 15:07:04      Final result by Davon Weeks MD (02/07/25 15:07:04)                   Narrative:    EXAMINATION  US PELVIS COMPLETE NON OB  WITH DOPPLER (XPD)    CLINICAL HISTORY  *pelvic pain L>R;  *LMP: 22 January 2025    TECHNIQUE  Multiplanar grayscale sonographic and focused Doppler evaluation of the pelvis was performed.    COMPARISON  9 September 2024 abdominopelvic CT    FINDINGS  Exam quality: adequate for evaluation    Uterus: Normal orientation and overall anatomic appearance.  No contour irregularity or focal lesion of the myometrium.  Linear echogenic structure consistent with IUD is present within the uterine cavity, associated posterior shadowing obscures visualization of the endometrial stripe.  There is no suspicious intrauterine fluid.  No definite solid mass or expansile lesion appreciated within region of the cervix.    Adnexa: There is no solid mass or otherwise concerning lesion.  The ovaries are of unremarkable size and sonographic appearance.  Doppler interrogation of the ovaries demonstrates grossly maintained arterial flow, with normal low-resistance spectral waveforms. Bilateral venous outflow is also appreciated.    Other findings: No free pelvic fluid is appreciated.  Visualized portions of the urinary bladder are unremarkable.    *Uterus: 8.2 cm x 4.6 cm x 6.7 cm  *Endometrial stripe: Inadequately visualized.  *Right ovary: 2.6 cm x 1.3 cm x 1.9 cm (3 cc)  *Left ovary: 2.6 cm x 1.7 cm x 2.3 cm (5 cc)    IMPRESSION  1. No convincing sonographic evidence of acute or focal pelvic abnormality.  2. Intrauterine findings consistent with IUD.      Electronically signed by: Davon Weeks  Date:    02/07/2025  Time:    15:07                                     Medications   ketorolac injection 30 mg (30 mg Intravenous Given 2/7/25 2973)   ondansetron injection 4 mg (4 mg Intravenous Given 2/7/25 3494)     Medical Decision Making  The patient is a 24 y.o. female who presents to the Emergency Department with a chief complaint of lower abdominal pain. Symptoms began 3 days ago and have been constant since onset. Her pain is currently  rated as a 5/10 in severity and described as sharp with no radiation. Associated symptoms include nausea and vomiting. Symptoms are aggravated with nothing and there are no alleviating factors. The patient denies fever or chills. She reports taking nothing prior to arrival with no relief of symptoms. No other reported symptoms at this time.      Judging by the patient's chief complaint and pertinent history, the patient has the following possible differential diagnoses, including but not limited to the following.  Some of these are deemed to be lower likelihood and some more likely based on my physical exam and history combined with possible lab work and/or imaging studies.   Please see the pertinent studies, and refer to the HPI.  Some of these diagnoses will take further evaluation to fully rule out, perhaps as an outpatient and the patient was encouraged to follow up when discharged for more comprehensive evaluation.    Ovarian cyst, appendicitis, biliary disease, diverticulitis,  AAA, ACS, mesenteric ischemia, intraabdominal abcess, retroperitoneal abcess, gastritis, gastroenteritis, hepatitis, hernia, pancreatitis, inflammatory bowel disease, PUD, SBP, nephrolithiasis, DKA, sickle cell crisis, consitpation, GERD, IBS     Problems Addressed:  LLQ abdominal pain: acute illness or injury    Amount and/or Complexity of Data Reviewed  Labs: ordered. Decision-making details documented in ED Course.  Radiology: ordered. Decision-making details documented in ED Course.    Risk  Prescription drug management.               ED Course as of 02/07/25 1536   Fri Feb 07, 2025   1510 US PELVIS COMPLETE NON OB WITH DOPPLER (XPD)     IMPRESSION  1. No convincing sonographic evidence of acute or focal pelvic abnormality.  2. Intrauterine findings consistent with IUD.   [LM]   1520 WBC: 7.66 [LM]   1520 BUN: 12.8 [LM]   1520 Creatinine: 0.83 [LM]   1533 Patient reports improvement of pain. US was negative so I did offer patient CT  which she declined. I discussed results in detail with patient including follow up. She is amendable to plan and ready for dc home. She was given strict Er return precautions.  [LM]      ED Course User Index  [LM] Marco Cazares NP                           Clinical Impression:  Final diagnoses:  [R10.32] LLQ abdominal pain (Primary)          ED Disposition Condition    Discharge Stable          ED Prescriptions       Medication Sig Dispense Start Date End Date Auth. Provider    ketorolac (TORADOL) 10 mg tablet Take 1 tablet (10 mg total) by mouth every 6 (six) hours as needed for Pain. 20 tablet 2/7/2025 2/12/2025 Marco Cazares NP    ondansetron (ZOFRAN-ODT) 4 MG TbDL Take 1 tablet (4 mg total) by mouth every 6 (six) hours as needed (Nausea). 20 tablet 2/7/2025 -- Marco Cazares NP          Follow-up Information       Follow up With Specialties Details Why Contact Info    Please contact [phone] to establish care with a primary care provider  Schedule an appointment as soon as possible for a visit                Marco Cazares NP  02/07/25 1013

## 2025-03-06 ENCOUNTER — HOSPITAL ENCOUNTER (EMERGENCY)
Facility: HOSPITAL | Age: 25
Discharge: HOME | End: 2025-03-06
Attending: EMERGENCY MEDICINE

## 2025-03-06 VITALS
HEIGHT: 61 IN | OXYGEN SATURATION: 100 % | RESPIRATION RATE: 18 BRPM | WEIGHT: 260 LBS | HEART RATE: 64 BPM | DIASTOLIC BLOOD PRESSURE: 76 MMHG | SYSTOLIC BLOOD PRESSURE: 105 MMHG | BODY MASS INDEX: 49.09 KG/M2 | TEMPERATURE: 98 F

## 2025-03-06 DIAGNOSIS — J20.9 ACUTE BRONCHITIS, UNSPECIFIED ORGANISM: Primary | ICD-10-CM

## 2025-03-06 DIAGNOSIS — R05.9 COUGH: ICD-10-CM

## 2025-03-06 PROCEDURE — 63600175 PHARM REV CODE 636 W HCPCS: Performed by: EMERGENCY MEDICINE

## 2025-03-06 PROCEDURE — 99284 EMERGENCY DEPT VISIT MOD MDM: CPT | Mod: 25

## 2025-03-06 PROCEDURE — 96372 THER/PROPH/DIAG INJ SC/IM: CPT | Performed by: EMERGENCY MEDICINE

## 2025-03-06 RX ORDER — DEXAMETHASONE SODIUM PHOSPHATE 4 MG/ML
8 INJECTION, SOLUTION INTRA-ARTICULAR; INTRALESIONAL; INTRAMUSCULAR; INTRAVENOUS; SOFT TISSUE
Status: COMPLETED | OUTPATIENT
Start: 2025-03-06 | End: 2025-03-06

## 2025-03-06 RX ORDER — BENZONATATE 200 MG/1
200 CAPSULE ORAL 3 TIMES DAILY PRN
Qty: 30 CAPSULE | Refills: 0 | Status: SHIPPED | OUTPATIENT
Start: 2025-03-06 | End: 2025-03-16

## 2025-03-06 RX ORDER — PREDNISONE 20 MG/1
60 TABLET ORAL DAILY
Qty: 15 TABLET | Refills: 0 | Status: SHIPPED | OUTPATIENT
Start: 2025-03-06 | End: 2025-03-11

## 2025-03-06 RX ADMIN — DEXAMETHASONE SODIUM PHOSPHATE 8 MG: 4 INJECTION, SOLUTION INTRA-ARTICULAR; INTRALESIONAL; INTRAMUSCULAR; INTRAVENOUS; SOFT TISSUE at 10:03

## 2025-03-07 LAB
OHS QRS DURATION: 74 MS
OHS QTC CALCULATION: 413 MS

## 2025-03-07 NOTE — ED PROVIDER NOTES
"Encounter Date: 3/6/2025       History     Chief Complaint   Patient presents with    Cough    Chest Heaviness     Pt to er with midline "chest heaviness" x 3 days     The history is provided by the patient.   Cough  This is a new problem. The current episode started several days ago. The problem occurs constantly. The problem has been unchanged. The cough is Productive of sputum. There has been no fever. Associated symptoms include chest pain and shortness of breath. Pertinent negatives include no sore throat. She has tried nothing for the symptoms.   States her child was recently ill will respiratory illness.  Cough has been ongoing for a week, CP began 3 days ago - worse when coughing.    Review of patient's allergies indicates:   Allergen Reactions    Pecan nut Anaphylaxis    Fish containing products Rash     Past Medical History:   Diagnosis Date    Morbid obesity      Past Surgical History:   Procedure Laterality Date     SECTION      TONSILLECTOMY       Family History   Problem Relation Name Age of Onset    Hypertension Father      Diabetes Father       Social History[1]  Review of Systems   Constitutional:  Negative for fever.   HENT:  Negative for sore throat.    Respiratory:  Positive for cough and shortness of breath.    Cardiovascular:  Positive for chest pain.   Gastrointestinal:  Negative for nausea.   Genitourinary:  Negative for dysuria.   Musculoskeletal:  Negative for back pain.   Skin:  Negative for rash.   Neurological:  Negative for weakness.   Hematological:  Does not bruise/bleed easily.       Physical Exam     Initial Vitals [25 2150]   BP Pulse Resp Temp SpO2   117/77 64 20 98 °F (36.7 °C) 100 %      MAP       --         Physical Exam    Nursing note and vitals reviewed.  Constitutional: She appears well-developed and well-nourished.   HENT:   Head: Normocephalic and atraumatic.   Right Ear: External ear normal.   Left Ear: External ear normal.   Eyes: Conjunctivae and EOM are " normal. Pupils are equal, round, and reactive to light.   Neck: Neck supple.   Normal range of motion.  Cardiovascular:  Normal rate, regular rhythm, normal heart sounds and intact distal pulses.               Pulmonary/Chest: Breath sounds normal.   Abdominal: Abdomen is soft. Bowel sounds are normal.   obese   Musculoskeletal:         General: Normal range of motion.      Cervical back: Normal range of motion and neck supple.     Neurological: She is alert and oriented to person, place, and time. GCS score is 15. GCS eye subscore is 4. GCS verbal subscore is 5. GCS motor subscore is 6.   Skin: Skin is warm and dry. Capillary refill takes less than 2 seconds.   Psychiatric: She has a normal mood and affect. Her behavior is normal. Judgment and thought content normal.         ED Course   Procedures  Labs Reviewed - No data to display  EKG Readings: (Independently Interpreted)   Initial Reading: No STEMI. Rhythm: Sinus Arrhythmia. Heart Rate: 59. Ectopy: No Ectopy. Conduction: Normal. ST Segments: Normal ST Segments. T Waves: Normal. T Waves Flipped: III. Axis: Normal. Clinical Impression: Sinus Arrhythmia       Imaging Results              X-Ray Chest PA And Lateral (Preliminary result)  Result time 03/06/25 22:42:57      Wet Read by Camilo Clayton MD (03/06/25 22:42:57, [facility] - Emergency Dept, Emergency Medicine)    NAF                                     Medications   dexAMETHasone injection 8 mg (8 mg Intramuscular Given 3/6/25 2221)     Medical Decision Making  Amount and/or Complexity of Data Reviewed  Radiology: ordered and independent interpretation performed. Decision-making details documented in ED Course.  ECG/medicine tests: ordered and independent interpretation performed. Decision-making details documented in ED Course.    Risk  Prescription drug management.    Differential includes:  bronchitis, viral URI, pleurisy, cardiac etiology.  Will obtain EKG and CXR.  Given  duration of symptoms and absence of fever, COVID/flu swab would be futile.  Will give IM steroids.                                  Clinical Impression:  Final diagnoses:  [R05.9] Cough  [J20.9] Acute bronchitis, unspecified organism (Primary)          ED Disposition Condition    Discharge Stable          ED Prescriptions       Medication Sig Dispense Start Date End Date Auth. Provider    predniSONE (DELTASONE) 20 MG tablet Take 3 tablets (60 mg total) by mouth once daily. for 5 days 15 tablet 3/6/2025 3/11/2025 Camilo Clayton MD    benzonatate (TESSALON) 200 MG capsule Take 1 capsule (200 mg total) by mouth 3 (three) times daily as needed for Cough. 30 capsule 3/6/2025 3/16/2025 Camilo Clayton MD          Follow-up Information       Follow up With Specialties Details Why Contact Info    Follow up with your primary MD in 3-5 days if not improved.  Return to ED for worsening symptoms.                     [1]   Social History  Tobacco Use    Smoking status: Never     Passive exposure: Never    Smokeless tobacco: Never   Substance Use Topics    Alcohol use: Yes     Comment: occassionally    Drug use: Never        Camilo Clayton MD  03/06/25 5376

## 2025-03-29 ENCOUNTER — HOSPITAL ENCOUNTER (EMERGENCY)
Facility: HOSPITAL | Age: 25
Discharge: HOME | End: 2025-03-29
Attending: EMERGENCY MEDICINE

## 2025-03-29 VITALS
OXYGEN SATURATION: 98 % | WEIGHT: 260 LBS | RESPIRATION RATE: 16 BRPM | HEART RATE: 69 BPM | HEIGHT: 61 IN | DIASTOLIC BLOOD PRESSURE: 60 MMHG | TEMPERATURE: 99 F | SYSTOLIC BLOOD PRESSURE: 112 MMHG | BODY MASS INDEX: 49.09 KG/M2

## 2025-03-29 DIAGNOSIS — R10.32 LEFT LOWER QUADRANT ABDOMINAL PAIN: Primary | ICD-10-CM

## 2025-03-29 DIAGNOSIS — K59.00 CONSTIPATION: ICD-10-CM

## 2025-03-29 LAB
B-HCG UR QL: NEGATIVE
BACTERIA #/AREA URNS AUTO: ABNORMAL /HPF
BILIRUB UR QL STRIP.AUTO: NEGATIVE
CLARITY UR: ABNORMAL
COLOR UR AUTO: ABNORMAL
GLUCOSE UR QL STRIP: NEGATIVE
HGB UR QL STRIP: ABNORMAL
KETONES UR QL STRIP: ABNORMAL
LEUKOCYTE ESTERASE UR QL STRIP: NEGATIVE
NITRITE UR QL STRIP: NEGATIVE
PH UR STRIP: 6 [PH]
PROT UR QL STRIP: NEGATIVE
RBC #/AREA URNS AUTO: ABNORMAL /HPF
SP GR UR STRIP.AUTO: 1.02 (ref 1–1.03)
SQUAMOUS #/AREA URNS AUTO: ABNORMAL /HPF
UROBILINOGEN UR STRIP-ACNC: 1
WBC #/AREA URNS AUTO: ABNORMAL /HPF

## 2025-03-29 PROCEDURE — 99284 EMERGENCY DEPT VISIT MOD MDM: CPT | Mod: 25

## 2025-03-29 PROCEDURE — 81025 URINE PREGNANCY TEST: CPT | Performed by: EMERGENCY MEDICINE

## 2025-03-29 PROCEDURE — 81003 URINALYSIS AUTO W/O SCOPE: CPT | Performed by: EMERGENCY MEDICINE

## 2025-03-29 RX ORDER — DICYCLOMINE HYDROCHLORIDE 20 MG/1
20 TABLET ORAL 4 TIMES DAILY
Qty: 40 TABLET | Refills: 0 | Status: SHIPPED | OUTPATIENT
Start: 2025-03-29 | End: 2025-04-08

## 2025-03-30 NOTE — ED PROVIDER NOTES
Encounter Date: 3/29/2025       History     Chief Complaint   Patient presents with    Abdominal Pain     C/O Abd and back pain x 4 days.  Concerned about constipation.  Small  BM today     The history is provided by the patient.   Abdominal Pain  The current episode started two days ago. The onset of the illness was gradual. The abdominal pain is generalized. The abdominal pain radiates to the left flank. The abdominal pain is relieved by nothing. The other symptoms of the illness do not include fever, shortness of breath, nausea, vomiting, diarrhea, dysuria, vaginal discharge or vaginal bleeding.   The patient states that she believes she is currently not pregnant. The patient has had a change in bowel habit. Additional symptoms associated with the illness include constipation and back pain.   States she has had similar symptoms in the past that were attributable to constipation.  LMP 3/23.  Denies urinary complaints.  No N/V/D.    Review of patient's allergies indicates:   Allergen Reactions    Pecan nut Anaphylaxis    Fish containing products Rash     Past Medical History:   Diagnosis Date    Morbid obesity      Past Surgical History:   Procedure Laterality Date     SECTION      TONSILLECTOMY       Family History   Problem Relation Name Age of Onset    Hypertension Father      Diabetes Father       Social History[1]  Review of Systems   Constitutional:  Negative for fever.   HENT:  Negative for sore throat.    Respiratory:  Negative for shortness of breath.    Cardiovascular:  Negative for chest pain.   Gastrointestinal:  Positive for abdominal pain and constipation. Negative for diarrhea, nausea and vomiting.   Genitourinary:  Negative for dysuria, vaginal bleeding and vaginal discharge.   Musculoskeletal:  Positive for back pain.   Skin:  Negative for rash.   Neurological:  Negative for weakness.   Hematological:  Does not bruise/bleed easily.       Physical Exam     Initial Vitals [25 1917]   BP  Pulse Resp Temp SpO2   112/60 69 16 98.6 °F (37 °C) 98 %      MAP       --         Physical Exam    Nursing note and vitals reviewed.  Constitutional: She appears well-developed and well-nourished.   HENT:   Head: Normocephalic and atraumatic.   Right Ear: External ear normal.   Left Ear: External ear normal.   Eyes: Conjunctivae and EOM are normal. Pupils are equal, round, and reactive to light.   Neck: Neck supple.   Normal range of motion.  Cardiovascular:  Normal rate, regular rhythm, normal heart sounds and intact distal pulses.           Pulmonary/Chest: Breath sounds normal.   Abdominal: Abdomen is soft. Bowel sounds are normal. There is generalized abdominal tenderness.   Morbidly obese There is no rebound and no guarding.   Musculoskeletal:         General: Normal range of motion.      Cervical back: Normal range of motion and neck supple.     Neurological: She is alert and oriented to person, place, and time. GCS score is 15. GCS eye subscore is 4. GCS verbal subscore is 5. GCS motor subscore is 6.   Skin: Skin is warm and dry. Capillary refill takes less than 2 seconds.   Psychiatric: She has a normal mood and affect. Her behavior is normal. Judgment and thought content normal.         ED Course   Procedures  Labs Reviewed   URINALYSIS, REFLEX TO URINE CULTURE - Abnormal       Result Value    Color, UA Light-Yellow      Appearance, UA Slightly Cloudy (*)     Specific Gravity, UA 1.020      pH, UA 6.0      Protein, UA Negative      Glucose, UA Negative      Ketones, UA Trace (*)     Blood, UA Trace (*)     Bilirubin, UA Negative      Urobilinogen, UA 1.0      Nitrites, UA Negative      Leukocyte Esterase, UA Negative     URINALYSIS, MICROSCOPIC - Abnormal    Bacteria, UA Rare      RBC, UA 0-2      WBC, UA 0-2      Squamous Epithelial Cells, UA Few (*)    PREGNANCY TEST, URINE RAPID - Normal    hCG Qualitative, Urine Negative            Imaging Results              X-Ray Abdomen Flat And Erect (In process)   Result time 03/29/25 19:58:06                     Medications - No data to display  Medical Decision Making  Amount and/or Complexity of Data Reviewed  Labs: ordered. Decision-making details documented in ED Course.  Radiology: ordered and independent interpretation performed. Decision-making details documented in ED Course.    Differential includes:  constipation, UTI, pregnancy, IBS, IBD, anxiety.  Will obtain UA, UPT and flat and erect abdominal x-ray.                                  Clinical Impression:  Final diagnoses:  [K59.00] Constipation  [R10.32] Left lower quadrant abdominal pain (Primary)          ED Disposition Condition    Discharge Stable          ED Prescriptions       Medication Sig Dispense Start Date End Date Auth. Provider    dicyclomine (BENTYL) 20 mg tablet Take 1 tablet (20 mg total) by mouth 4 (four) times daily. for 10 days 40 tablet 3/29/2025 4/8/2025 Camilo Clayton MD    lactulose (CHRONULAC) 20 gram/30 mL Soln Take 15 mLs (10 g total) by mouth 2 (two) times daily. for 7 days 210 mL 3/29/2025 4/5/2025 Camilo Clayton MD          Follow-up Information       Follow up With Specialties Details Why Contact Info    Follow up with your primary MD in 3-5 days if not improved.  Return to ED for worsening symptoms.                     [1]   Social History  Tobacco Use    Smoking status: Never     Passive exposure: Never    Smokeless tobacco: Never   Substance Use Topics    Alcohol use: Yes     Comment: occassionally    Drug use: Never        Camilo Clayton MD  03/29/25 1959

## 2025-06-04 ENCOUNTER — TELEPHONE (OUTPATIENT)
Dept: GYNECOLOGY | Facility: CLINIC | Age: 25
End: 2025-06-04

## 2025-08-02 ENCOUNTER — HOSPITAL ENCOUNTER (EMERGENCY)
Facility: HOSPITAL | Age: 25
Discharge: HOME | End: 2025-08-02
Attending: EMERGENCY MEDICINE

## 2025-08-02 VITALS
SYSTOLIC BLOOD PRESSURE: 117 MMHG | HEIGHT: 60 IN | OXYGEN SATURATION: 100 % | BODY MASS INDEX: 51.04 KG/M2 | DIASTOLIC BLOOD PRESSURE: 71 MMHG | TEMPERATURE: 98 F | WEIGHT: 260 LBS | RESPIRATION RATE: 20 BRPM | HEART RATE: 61 BPM

## 2025-08-02 DIAGNOSIS — R10.30 LOWER ABDOMINAL PAIN: Primary | ICD-10-CM

## 2025-08-02 DIAGNOSIS — R30.0 DYSURIA: ICD-10-CM

## 2025-08-02 LAB
ALBUMIN SERPL-MCNC: 3.5 G/DL (ref 3.5–5)
ALBUMIN/GLOB SERPL: 0.8 RATIO (ref 1.1–2)
ALP SERPL-CCNC: 53 UNIT/L (ref 40–150)
ALT SERPL-CCNC: 13 UNIT/L (ref 0–55)
ANION GAP SERPL CALC-SCNC: 6 MEQ/L
AST SERPL-CCNC: 14 UNIT/L (ref 11–45)
B-HCG UR QL: NEGATIVE
BASOPHILS # BLD AUTO: 0.05 X10(3)/MCL
BASOPHILS NFR BLD AUTO: 0.6 %
BILIRUB SERPL-MCNC: 0.5 MG/DL
BILIRUB UR QL STRIP.AUTO: NEGATIVE
BUN SERPL-MCNC: 9.8 MG/DL (ref 7–18.7)
C TRACH DNA SPEC QL NAA+PROBE: NOT DETECTED
CALCIUM SERPL-MCNC: 8.8 MG/DL (ref 8.4–10.2)
CHLORIDE SERPL-SCNC: 106 MMOL/L (ref 98–107)
CLARITY UR: CLEAR
CO2 SERPL-SCNC: 26 MMOL/L (ref 22–29)
COLOR UR AUTO: NORMAL
CREAT SERPL-MCNC: 0.8 MG/DL (ref 0.55–1.02)
CREAT/UREA NIT SERPL: 12
EOSINOPHIL # BLD AUTO: 0.1 X10(3)/MCL (ref 0–0.9)
EOSINOPHIL NFR BLD AUTO: 1.3 %
ERYTHROCYTE [DISTWIDTH] IN BLOOD BY AUTOMATED COUNT: 13.3 % (ref 11.5–17)
GFR SERPLBLD CREATININE-BSD FMLA CKD-EPI: >60 ML/MIN/1.73/M2
GLOBULIN SER-MCNC: 4.6 GM/DL (ref 2.4–3.5)
GLUCOSE SERPL-MCNC: 94 MG/DL (ref 74–100)
GLUCOSE UR QL STRIP: NEGATIVE
HCT VFR BLD AUTO: 39 % (ref 37–47)
HGB BLD-MCNC: 13.5 G/DL (ref 12–16)
HGB UR QL STRIP: NEGATIVE
IMM GRANULOCYTES # BLD AUTO: 0.01 X10(3)/MCL (ref 0–0.04)
IMM GRANULOCYTES NFR BLD AUTO: 0.1 %
KETONES UR QL STRIP: NEGATIVE
LACTATE SERPL-SCNC: 0.7 MMOL/L (ref 0.5–2.2)
LEUKOCYTE ESTERASE UR QL STRIP: NEGATIVE
LIPASE SERPL-CCNC: 17 U/L
LYMPHOCYTES # BLD AUTO: 2.64 X10(3)/MCL (ref 0.6–4.6)
LYMPHOCYTES NFR BLD AUTO: 34 %
MCH RBC QN AUTO: 31.8 PG (ref 27–31)
MCHC RBC AUTO-ENTMCNC: 34.6 G/DL (ref 33–36)
MCV RBC AUTO: 92 FL (ref 80–94)
MONOCYTES # BLD AUTO: 0.63 X10(3)/MCL (ref 0.1–1.3)
MONOCYTES NFR BLD AUTO: 8.1 %
N GONORRHOEA DNA SPEC QL NAA+PROBE: NOT DETECTED
NEUTROPHILS # BLD AUTO: 4.34 X10(3)/MCL (ref 2.1–9.2)
NEUTROPHILS NFR BLD AUTO: 55.9 %
NITRITE UR QL STRIP: NEGATIVE
NRBC BLD AUTO-RTO: 0 %
PH UR STRIP: 6 [PH]
PLATELET # BLD AUTO: 325 X10(3)/MCL (ref 130–400)
PMV BLD AUTO: 9.7 FL (ref 7.4–10.4)
POTASSIUM SERPL-SCNC: 4.1 MMOL/L (ref 3.5–5.1)
PROT SERPL-MCNC: 8.1 GM/DL (ref 6.4–8.3)
PROT UR QL STRIP: NEGATIVE
RBC # BLD AUTO: 4.24 X10(6)/MCL (ref 4.2–5.4)
SODIUM SERPL-SCNC: 138 MMOL/L (ref 136–145)
SP GR UR STRIP.AUTO: 1.02 (ref 1–1.03)
SPECIMEN SOURCE: NORMAL
UROBILINOGEN UR STRIP-ACNC: 0.2
WBC # BLD AUTO: 7.77 X10(3)/MCL (ref 4.5–11.5)

## 2025-08-02 PROCEDURE — 87591 N.GONORRHOEAE DNA AMP PROB: CPT | Performed by: EMERGENCY MEDICINE

## 2025-08-02 PROCEDURE — 99284 EMERGENCY DEPT VISIT MOD MDM: CPT | Mod: 25

## 2025-08-02 PROCEDURE — 83605 ASSAY OF LACTIC ACID: CPT | Performed by: EMERGENCY MEDICINE

## 2025-08-02 PROCEDURE — 83690 ASSAY OF LIPASE: CPT | Performed by: EMERGENCY MEDICINE

## 2025-08-02 PROCEDURE — 85025 COMPLETE CBC W/AUTO DIFF WBC: CPT | Performed by: EMERGENCY MEDICINE

## 2025-08-02 PROCEDURE — 80053 COMPREHEN METABOLIC PANEL: CPT | Performed by: EMERGENCY MEDICINE

## 2025-08-02 PROCEDURE — 25000003 PHARM REV CODE 250: Performed by: EMERGENCY MEDICINE

## 2025-08-02 PROCEDURE — 96361 HYDRATE IV INFUSION ADD-ON: CPT

## 2025-08-02 PROCEDURE — 81025 URINE PREGNANCY TEST: CPT | Performed by: EMERGENCY MEDICINE

## 2025-08-02 PROCEDURE — 81003 URINALYSIS AUTO W/O SCOPE: CPT | Performed by: EMERGENCY MEDICINE

## 2025-08-02 PROCEDURE — 96360 HYDRATION IV INFUSION INIT: CPT

## 2025-08-02 RX ORDER — SODIUM CHLORIDE 9 MG/ML
1000 INJECTION, SOLUTION INTRAVENOUS
Status: COMPLETED | OUTPATIENT
Start: 2025-08-02 | End: 2025-08-02

## 2025-08-02 RX ORDER — PHENAZOPYRIDINE HYDROCHLORIDE 100 MG/1
100 TABLET, FILM COATED ORAL
Status: COMPLETED | OUTPATIENT
Start: 2025-08-02 | End: 2025-08-02

## 2025-08-02 RX ORDER — PHENAZOPYRIDINE HYDROCHLORIDE 200 MG/1
200 TABLET, FILM COATED ORAL 3 TIMES DAILY
Qty: 6 TABLET | Refills: 0 | Status: SHIPPED | OUTPATIENT
Start: 2025-08-02 | End: 2025-08-04

## 2025-08-02 RX ADMIN — SODIUM CHLORIDE 1000 ML: 9 INJECTION, SOLUTION INTRAVENOUS at 11:08

## 2025-08-02 RX ADMIN — PHENAZOPYRIDINE HYDROCHLORIDE 100 MG: 100 TABLET, FILM COATED ORAL at 12:08

## 2025-08-02 NOTE — ED PROVIDER NOTES
Encounter Date: 2025       History     Chief Complaint   Patient presents with    Abdominal Pain     C/O lower ABD pain radiating to back with dysuria.  Symptoms started 4 days ago     Patient is a 24 yo F presenting with dysuria and lower abdominal pain x 4 days. She has some mild lower back pain as well, the R slightly worse than the left. She describes urgency and frequency as well. No vaginal bleeding or discharge. No chest pain, abdominal pain, sob, constipation, or diarrhea. No new sexual partners. She is sexually active.         Review of patient's allergies indicates:   Allergen Reactions    Pecan nut Anaphylaxis    Fish containing products Rash     Past Medical History:   Diagnosis Date    Morbid obesity      Past Surgical History:   Procedure Laterality Date     SECTION      TONSILLECTOMY       Family History   Problem Relation Name Age of Onset    Hypertension Father      Diabetes Father       Social History[1]  Review of Systems   All other systems reviewed and are negative.      Physical Exam     Initial Vitals   BP Pulse Resp Temp SpO2   25 1037 25 1037 25 1037 25 1045 25 1037   (!) 96/57 (!) 56 20 98.1 °F (36.7 °C) 98 %      MAP       --                Physical Exam    Nursing note and vitals reviewed.  Constitutional: She appears well-developed and well-nourished. No distress.   HENT:   Head: Normocephalic and atraumatic.   Cardiovascular:  Normal rate, regular rhythm and normal heart sounds.           No murmur heard.  Pulmonary/Chest: Breath sounds normal. No respiratory distress. She has no wheezes. She has no rhonchi.   Abdominal: Abdomen is soft. Bowel sounds are normal. She exhibits no distension. There is no abdominal tenderness.   No CVAT There is no rebound and no guarding.   Musculoskeletal:         General: No edema. Normal range of motion.     Neurological: She is alert and oriented to person, place, and time.   Skin: Skin is warm and dry.    Psychiatric: She has a normal mood and affect. Thought content normal.         ED Course   Procedures  Labs Reviewed   COMPREHENSIVE METABOLIC PANEL - Abnormal       Result Value    Sodium 138      Potassium 4.1      Chloride 106      CO2 26      Glucose 94      Blood Urea Nitrogen 9.8      Creatinine 0.80      Calcium 8.8      Protein Total 8.1      Albumin 3.5      Globulin 4.6 (*)     Albumin/Globulin Ratio 0.8 (*)     Bilirubin Total 0.5      ALP 53      ALT 13      AST 14      eGFR >60      Anion Gap 6.0      BUN/Creatinine Ratio 12     CBC WITH DIFFERENTIAL - Abnormal    WBC 7.77      RBC 4.24      Hgb 13.5      Hct 39.0      MCV 92.0      MCH 31.8 (*)     MCHC 34.6      RDW 13.3      Platelet 325      MPV 9.7      Neut % 55.9      Lymph % 34.0      Mono % 8.1      Eos % 1.3      Basophil % 0.6      Imm Grans % 0.1      Neut # 4.34      Lymph # 2.64      Mono # 0.63      Eos # 0.10      Baso # 0.05      Imm Gran # 0.01      NRBC% 0.0     URINALYSIS, REFLEX TO URINE CULTURE - Normal    Color, UA Straw      Appearance, UA Clear      Specific Gravity, UA 1.020      pH, UA 6.0      Protein, UA Negative      Glucose, UA Negative      Ketones, UA Negative      Blood, UA Negative      Bilirubin, UA Negative      Urobilinogen, UA 0.2      Nitrites, UA Negative      Leukocyte Esterase, UA Negative     PREGNANCY TEST, URINE RAPID - Normal    hCG Qualitative, Urine Negative     LIPASE - Normal    Lipase Level 17     LACTIC ACID, PLASMA - Normal    Lactic Acid Level 0.7     CHLAMYDIA/GONORRHOEAE(GC), PCR   CBC W/ AUTO DIFFERENTIAL    Narrative:     The following orders were created for panel order CBC W/ AUTO DIFFERENTIAL.  Procedure                               Abnormality         Status                     ---------                               -----------         ------                     CBC with Differential[[id number]]       Abnormal            Final result                 Please view results for these tests on  the individual orders.          Imaging Results    None          Medications   0.9% NaCl infusion (1,000 mLs Intravenous New Bag 8/2/25 1105)   phenazopyridine tablet 100 mg (100 mg Oral Given 8/2/25 1213)     Medical Decision Making  Differentials considered but not limited to UTI, STD, pyelonephritis, urethritis, chemical irritation, vaginal atrophy, bartholin cyst, interstitial cystitis.    Patient has no vaginal discharge.  On exam she has no abdominal tenderness to palpation no CVA tenderness.  She is well-appearing and in no acute distress.  Her laboratory results show no leukocytosis, no left shift.  There was no signs of infection in her urine.  Admitting and gonorrhea are pending.  I did discuss with the patient getting a CT abdomen and pelvis to evaluate further possible emergent condition as we have not found a clear cause for her symptoms today.  After discussion of risks and benefit, patient opts to go home and try different soaps and detergents while taking the Pyridium to see if this improves her symptoms as it may be chemical irritation causing her dysuria.  I did discuss with her signs and symptoms to return for.     Problems Addressed:  Dysuria: acute illness or injury  Lower abdominal pain: acute illness or injury    Amount and/or Complexity of Data Reviewed  Labs: ordered. Decision-making details documented in ED Course.    Risk  Prescription drug management.               ED Course as of 08/02/25 1226   Sat Aug 02, 2025   1219 BP: 117/71  Results were reviewed and discussed with the patient.  I did offer her a CT abdomen and pelvis to evaluate for emergent condition since we do not have a clear cause for her symptoms today.  At this time she declines.  She will try changing to less abrasive soaps and detergents and take Pyridium for 1-2 days.  She will return if her symptoms persist or if they worsen.  She has a [employer] oneyda and we will check on her chlamydia and gonorrhea results in 48 hours  however I did tell her that we will call her if they are positive.  I discussed with her other signs and symptoms to return for.  She is comfortable with plan of care. [GM]      ED Course User Index  [GM] Amanda Varner MD                               Clinical Impression:  Final diagnoses:  [R10.30] Lower abdominal pain (Primary)  [R30.0] Dysuria          ED Disposition Condition    Discharge Stable          ED Prescriptions       Medication Sig Dispense Start Date End Date Auth. Provider    phenazopyridine (PYRIDIUM) 200 MG tablet Take 1 tablet (200 mg total) by mouth 3 (three) times daily. for 2 days 6 tablet 8/2/2025 8/4/2025 Amanda Varner MD          Follow-up Information       Follow up With Specialties Details Why Contact Info    Please follow up with a primary care physician.  Call in 3 days If symptoms worsen, return to the ED If you do not have a doctor, please call [phone] to schedule your follow up with a local primary care doctor.    [facility] - Emergency Dept Emergency Medicine  If symptoms worsen [id number]  [facility] [phone]                   [1]   Social History  Tobacco Use    Smoking status: Never     Passive exposure: Never    Smokeless tobacco: Never   Substance Use Topics    Alcohol use: Yes     Comment: occassionally    Drug use: Never        Amanda Varner MD  08/02/25 2583